# Patient Record
Sex: MALE | Race: WHITE
[De-identification: names, ages, dates, MRNs, and addresses within clinical notes are randomized per-mention and may not be internally consistent; named-entity substitution may affect disease eponyms.]

---

## 2017-05-08 NOTE — HP
COWS





- Scale


Resting Pulse: 0= [place] 80 or Below


Sweatin= Chills/Flushing


Restless Observation: 3= Extraneous Movement


Pupil Size: 0= Normal to Room Light


Bone or Joint Aches: 2= Severe Diffuse Aches


Runny Nose/ Eye Tearin= Runny Nose/Eyes


GI Upset > 30mins: 1= Stomach Cramp


Tremor Observation: 2= Slight Tremor Visible


Yawning Observation: 1= 1-2x During Session


Anxiety or Irritability: 2=Irritable/Anxious


Goose Flesh Skin: 0=Smooth Skin


COWS Score: 14





Admission [facility]





- [facility]


Chief Complaint: 


WITHDRAWAL SX


Allergies/Adverse Reactions: 


 Allergies











Allergy/AdvReac Type Severity Reaction Status Date / Time


 


No Known Allergies Allergy   Verified 17 20:23











History of Present Illness: 


31 YEARS OLD MALE WITH LONG HISTORY OF OPIOID NICOTINE DEPENDENCE, HAS TOOTH 

ACHE, POSITIVE PPD AND DEPRESSION IS ADMITTED TO DETOX


Exam Limitations: No Limitations





- Ebola screening


Have you traveled outside of the country in the last 21 days: No


Have you had contact with anyone from an Ebola affected area: No


Have you been sick,other than usual withdrawal symptoms: No


Do you have a fever: No





- Review of Systems


Constitutional: Chills, Changes in sleep, Weight Stable


EENT: reports: Dental Problems (TOOTH ACHE)


Respiratory: reports: No Symptoms reported


Cardiac: reports: No Symptoms Reported


GI: reports: Nausea, Poor Fluid Intake, Abdominal cramping


: reports: No Symptoms Reported


Musculoskeletal: reports: Back Pain, Joint Pain, Muscle Pain, Neck Pain


Integumentary: reports: No Symptoms Reported


Neuro: reports: Tremors


Endocrine: reports: No Symptoms Reported


Hematology: reports: No Symptoms Reported


Psychiatric: reports: Judgement Intact, Orientated x3, Anxious, Depressed


Other Systems: Reviewed and Negative





Patient History





- Patient Medical History


Hx Anemia: No


Hx Asthma: No


Hx Chronic Obstructive Pulmonary Disease (COPD): No


Hx Cancer: No


Hx Cardiac Disorders: No


Hx Congestive Heart Failure: No


Hx Hypertension: No


Hx Hypercholesterolemia: No


Hx Pacemaker: No


HX Cerebrovascular Accident: No


Hx Seizures: No


Hx Dementia: No


Hx Diabetes: No


Hx Gastrointestinal Disorders: No


Hx Liver Disease: No


Hx Genitourinary Disorders: No


Hx Sexually Transmitted Disorders: No


Hx Renal Disease (ESRD): No


Hx Thyroid Disease: No


Hx Human Immunodeficiency Virus (HIV): No


Hx Hepatitis C: No


Hx Depression: Yes


Hx Suicide Attempt: No


Hx Bipolar Disorder: No


Hx Schizophrenia: No





- Patient Surgical History


Past Surgical History: Yes


Hx Neurologic Surgery: No


Hx Cataract Extraction: No


Hx Cardiac Surgery: No


Hx Lung Surgery: No


Hx Breast Surgery: No


Hx Breast Biopsy: No


Hx Abdominal Surgery: No


Hx Appendectomy: No


Hx Cholecystectomy: No


Hx Genitourinary Surgery: No


Hx Orthopedic Surgery: No


Other Surgical History: s/p tonsillectomy DURING CHILDHOOD


Anesthesia Reaction: No





- PPD History


Previous Implant?: Yes


Documented Results: Positive w/o proof


Implanted On Prior [facility] Admission?: Yes


Date: 10/19/15


Results: 15mm


PPD to be Administered?: No





- Smoking Cessation


Smoking history: Current every day smoker


Have you smoked in the past 12 months: Yes


Aproximately how many cigarettes per day: 20


Cigars Per Day: 0


Hx Chewing Tobacco Use: No


Initiated information on smoking cessation: Yes


'Breaking Loose' booklet given: 17





- Substance & Tx. History


Hx Alcohol Use: No


Hx Substance Use: Yes


Substance Use Type: Alcohol, Cocaine, Opiates


Hx Substance Use Treatment: Yes





- Substances Abused


  ** Heroin


Route: Inhalation


Frequency: Daily


Amount used: 5 BAGS


Age of first use: 28


Date of Last Use: 17





Family Disease History





- Family Disease History


Family History: Unremarkable





Admission Physical Exam [facility]





- Vital Signs


Vital Signs: 


 Vital Signs - 24 hr











  17





  17:48


 


Temperature 97.4 F L


 


Pulse Rate 73


 


Respiratory 18





Rate 


 


Blood Pressure 109/66














- Physical


General Appearance: Yes: Appropriately Dressed, Mild Distress, Thin, Tremorous, 

Irritable, Sweating, Anxious


HEENTM: Yes: Hearing grossly Normal, Normal ENT Inspection, Normocephalic, 

Normal Voice


Respiratory: Yes: Chest Non-Tender, Lungs Clear, Normal Breath Sounds, No 

Respiratory Distress, No Accessory Muscle Use


Neck: Yes: Supple, Trachea in good position


Breast: Yes: Breasts Symetrical


Cardiology: Yes: Regular Rhythm, Regular Rate, S1, S2


Abdominal: Yes: Non Tender, Soft


Genitourinary: Yes: Within Normal Limits


Back: Yes: Normal Inspection


Musculoskeletal: Yes: full range of Motion, Gait Steady, Back pain, Muscle Pain


Extremities: Yes: Normal Inspection, Normal Range of Motion, Non-Tender, Tremors


Neurological: Yes: Fully Oriented, Alert, Motor Strength 5/5, Normal Response, 

Depressed Affect


Integumentary: Yes: Warm


Lymphatic: Yes: Within Normal Limits





- Diagnostic


(1) Opioid dependence with withdrawal


Status: Acute





(2) Nicotine dependence


Status: Acute   Qualifiers: 


     Nicotine product type: cigarettes     Substance use status: in withdrawal 

       Qualified Code(s): F17.213 - Nicotine dependence, cigarettes, with 

withdrawal  





(3) Positive PPD, treated


Status: Resolved





(4) Depression


Status: Suspected   Qualifiers: 


     Depression Type: dysthymia        Qualified Code(s): F34.1 - Dysthymic 

disorder  





(5) Atypical toothache


Status: Acute


Comment: LIDOCAINE BEFORE EACH MEAL











Cleared for Admission [facility]





- Detox or Rehab


[facility] Level of Care: Medically Managed


Detox Regimen/Protocol: Methadone





[facility] Breath Alcohol Content


Breath Alcohol Content: 0





Urine Drug Screen





- Results


Drug Screen Negative: No


Urine Drug Screen Results: PABLO-Cocaine, OPI-Opiates, MTD-Methadone

## 2017-05-09 NOTE — PN
[facility] COWS





- Scale


Resting Pulse: 1= [place] 


Sweatin=Flushed/Facial Moisture


Restless Observation: 1= Difficult to Sit Still


Pupil Size: 0= Normal to Room Light


Bone or Joint Aches: 2= Severe Diffuse Aches


Runny Nose/ Eye Tearin= Runny Nose/Eyes


GI Upset > 30mins: 1= Stomach Cramp


Tremor Observation of Outstretched Hands: 2= Slight Tremor Visible


Yawning Observation: 2= >3x During Session


Anxiety or Irritability: 2=Irritable/Anxious


Goose Flesh Skin: 3=Piloerection


COWS Score: 18





[facility] Progress Note (SOAP)


Subjective: 


irritable


agitation


chills


sweats


interrupted sleep


body aches


nausea


Objective: 





17 11:26


 Vital Signs











Temperature  98.4 F   17 11:00


 


Pulse Rate  82   17 11:00


 


Respiratory Rate  16   17 11:00


 


Blood Pressure  119/73   17 11:00


 


O2 Sat by Pulse Oximetry (%)      








 Laboratory Tests











  17





  23:50 07:00 07:00


 


WBC   7.9 


 


RBC   4.84 


 


Hgb   14.7 


 


Hct   43.3 


 


MCV   89.4 


 


MCHC   34.0 


 


RDW   13.0 


 


Plt Count   194 


 


MPV   7.8 


 


Sodium    143


 


Potassium    3.6


 


Chloride    106


 


Carbon Dioxide    27


 


Anion Gap    10


 


BUN    14  D


 


Creatinine    1.1  D


 


Creat Clearance w eGFR    > 60


 


Random Glucose    89


 


Calcium    8.9


 


Total Bilirubin    0.3


 


AST    9 L D


 


ALT    14  D


 


Alkaline Phosphatase    66


 


Total Protein    6.9


 


Albumin    3.8


 


Urine Color  Dkyellow  


 


Urine Appearance  Clear  


 


Urine pH  5.0  


 


Ur Specific Gravity  1.020  


 


Urine Protein  Negative  


 


Urine Glucose (UA)  Negative  


 


Urine Ketones  Trace H  


 


Urine Blood  Negative  


 


Urine Nitrite  Negative  


 


Urine Bilirubin  Negative  


 


Urine Urobilinogen  Negative  


 


Ur Leukocyte Esterase  Negative  


 


RPR Titer   














  17





  07:00


 


WBC 


 


RBC 


 


Hgb 


 


Hct 


 


MCV 


 


MCHC 


 


RDW 


 


Plt Count 


 


MPV 


 


Sodium 


 


Potassium 


 


Chloride 


 


Carbon Dioxide 


 


Anion Gap 


 


BUN 


 


Creatinine 


 


Creat Clearance w eGFR 


 


Random Glucose 


 


Calcium 


 


Total Bilirubin 


 


AST 


 


ALT 


 


Alkaline Phosphatase 


 


Total Protein 


 


Albumin 


 


Urine Color 


 


Urine Appearance 


 


Urine pH 


 


Ur Specific Gravity 


 


Urine Protein 


 


Urine Glucose (UA) 


 


Urine Ketones 


 


Urine Blood 


 


Urine Nitrite 


 


Urine Bilirubin 


 


Urine Urobilinogen 


 


Ur Leukocyte Esterase 


 


RPR Titer  Nonreactive








awake/alert


ambulating


no acute distress


Assessment: 





17 11:26


withdrawal sx


Plan: 


continue detox


increase fluids


tigan po

## 2017-05-09 NOTE — EKG
Test Reason : 

Blood Pressure : ***/*** mmHG

Vent. Rate : 093 BPM     Atrial Rate : 093 BPM

   P-R Int : 148 ms          QRS Dur : 086 ms

    QT Int : 344 ms       P-R-T Axes : 080 066 047 degrees

   QTc Int : 427 ms

 

NORMAL SINUS RHYTHM

NORMAL ECG

NO PREVIOUS ECGS AVAILABLE

Confirmed by RAMOS THOMAS MD ([id number]) on 5/9/2017 3:07:08 PM

 

Referred By:             Confirmed By:RAMOS THOMAS MD

## 2017-05-09 NOTE — CONSULT
[facility] Psychiatric Consult





- Data


Date of interview: 05/09/17


Admission source: [facility]


Identifying data: Readmission to [facility] for this 30 y/o [place]-born male 

seeking detox treatment,[address],for heroin and cocaine dependence.Patient is 

single without children,domiciled,unemployed and supported by relatives.


Substance Abuse History: - Smoking Cessation.  Smoking history: Current every 

day smoker.  Have you smoked in the past 12 months: Yes.  Aproximately how many 

cigarettes per day: 20.  Cigars Per Day: 0.  Hx Chewing Tobacco Use: No.  

Initiated information on smoking cessation: Yes.  'Breaking Loose' booklet given

: 05/08/17.  - Substance & Tx. History.  Hx Alcohol Use: No.  Hx Substance Use: 

Yes.  Substance Use Type: Alcohol, Cocaine, Opiates.  Hx Substance Use Treatment

: Yes.  - Substances Abused.  ** Heroin.  Route: Inhalation.  Frequency: Daily.

  Amount used: 5 BAGS.  Age of first use: 28.  Date of Last Use: 05/07/17.  

Confirmed by patient.


Medical History: Good general health.Remote history of tonsillectomy (childhood)

.


Psychiatric History: Patient denies.


Physical/Sexual Abuse/Trauma History: Patient denies.


Additional Comment: Urine Drug Screen Results: PABLO-Cocaine, OPI-Opiates, MTD-

Methadone.Noted.





Mental Status Exam





- Mental Status Exam


Alert and Oriented to: Time, Place, Person


Cognitive Function: Good


Patient Appearance: Unkempt, Disheveled


Mood: Nervous, Withdrawn, Anxious


Affect: Mood Congruent


Patient Behavior: Fatigued, Appropriate, Cooperative


Speech Pattern: Clear


Voice Loudness: Normal


Thought Process: Goal Oriented


Thought Disorder: Not Present


Hallucinations: Denies


Suicidal Ideation: Denies


Homicidal Ideation: Denies


Insight/Judgement: Poor


Sleep: Poorly, Difficulty falling asleep


Appetite: Fair


Muscle strength/Tone: Normal


Gait/Station: Normal





Psychiatric Findings





- Problem List (Axis 1, 2,3)


(1) Opioid dependence with withdrawal


Current Visit: Yes   Status: Acute





(2) Cocaine dependence


Current Visit: Yes   Status: Acute   





(3) Nicotine dependence


Current Visit: Yes   Status: Acute   Qualifiers: 


     Nicotine product type: cigarettes     Substance use status: in withdrawal 

       Qualified Code(s): F17.213 - Nicotine dependence, cigarettes, with 

withdrawal  





(4) Substance induced mood disorder


Current Visit: Yes   Status: Acute





(5) Positive PPD, treated


Current Visit: Yes   Status: Resolved





(6) Insomnia


Current Visit: Yes   Status: Acute   








- Initial Treatment Plan


Initial Treatment Plan: Psychoeducation.Detoxification.Ambien 10 mg po 

hs.Patient is made aware of the potential for parasomnias.Consent (verbal) 

given for treatment with zolpidem.Observation.

## 2017-05-16 NOTE — DS
[facility] Detox Discharge Summary


Admission Date: 


05/08/17





Discharge Date: 05/09/17





- History


Present History: Cocaine Dependence, Opioid Dependence





- Physical Exam Results


Vital Signs: 


 Vital Signs











Temperature  99.0 F   05/09/17 18:17


 


Pulse Rate  73   05/09/17 18:17


 


Respiratory Rate  18   05/09/17 18:17


 


Blood Pressure  104/63   05/09/17 18:17


 


O2 Sat by Pulse Oximetry (%)      














- Treatment


Hospital Course: Detox Protocol Followed





- Medication


Discharge Medications: 


Ambulatory Orders





NK [No Known Home Medication]  05/08/17 











- Diagnosis


(1) Anxiety


Status: Chronic





(2) Atypical toothache


Status: Acute





(3) Cannabis dependence


Status: Chronic





(4) Cocaine dependence


Status: Chronic   Qualifiers: 


     Complication of substance-induced condition: with unspecified complication





(5) Nicotine dependence


Status: Acute   Qualifiers: 


     Nicotine product type: cigarettes     Substance use status: in withdrawal 

       Qualified Code(s): F17.213 - Nicotine dependence, cigarettes, with 

withdrawal  








- AMA


Did Patient Leave Against Medical Advice: Yes

## 2018-05-07 NOTE — HP
COWS





- Scale


Resting Pulse: 0= [place] 80 or Below


Sweatin= Chills/Flushing


Restless Observation: 1= Difficult to Sit Still


Pupil Size: 1= Pupils >than Normal


Bone or Joint Aches: 1= Mild Discomfort


Runny Nose/ Eye Tearin= Runny Nose/Eyes


GI Upset > 30mins: 1= Stomach Cramp


Tremor Observation: 1= Tremor Felt, Not Seen


Yawning Observation: 1= 1-2x During Session


Anxiety or Irritability: 1=Feels Anxious/Irritable


Goose Flesh Skin: 0=Smooth Skin


COWS Score: 10





Admission ROS S





- HPI


Chief Complaint: 





" I have a problem with opiates, sometimes I use methadone"


heroin withdrawal symptoms  


Allergies/Adverse Reactions: 


 Allergies











Allergy/AdvReac Type Severity Reaction Status Date / Time


 


No Known Allergies Allergy   Verified 18 17:50











History of Present Illness: 





31 yo male with hx of heroin (paranasal) and nicotine dependence is here 

seeking detox. PMHX: depression and insomnia. Denies suicidal / homicidal 

ideation or suicide attempts. Last detox 7 moths ago at [facility]. 

Longest period of sobriety 5 days over the 5 years hx of heroin use. Denies hx 

of overdose, seizures or blackouts. 


Exam Limitations: No Limitations





- Ebola screening


Have you traveled outside of the country in the last 21 days: No


Have you had contact with anyone from an Ebola affected area: No


Have you been sick,other than usual withdrawal symptoms: No


Do you have a fever: No





- Review of Systems


Constitutional: Chills, Changes in sleep, Weakness


EENT: reports: Other (runny nose)


Respiratory: reports: No Symptoms reported


Cardiac: reports: Chest Pain (denies chest pain at this time, reports gets 

chest pain when he goes through withdrawal)


GI: reports: Abdominal cramping


: reports: No Symptoms Reported


Musculoskeletal: reports: Back Pain, Joint Pain


Integumentary: reports: No Symptoms Reported


Neuro: reports: Headache (7/10), Weakness


Endocrine: reports: Increased Thirst


Hematology: reports: No Symptoms Reported


Psychiatric: reports: Orientated x3, Depressed


Other Systems: Reviewed and Negative





Patient History





- Patient Medical History


Hx Anemia: No


Hx Asthma: No


Hx Chronic Obstructive Pulmonary Disease (COPD): No


Hx Cancer: No


Hx Cardiac Disorders: No


Hx Congestive Heart Failure: No


Hx Hypertension: No


Hx Hypercholesterolemia: No


Hx Pacemaker: No


HX Cerebrovascular Accident: No


Hx Seizures: No


Hx Dementia: No


Hx Diabetes: No


Hx Gastrointestinal Disorders: No


Hx Liver Disease: No


Hx Genitourinary Disorders: No


Hx Sexually Transmitted Disorders: No


Hx Renal Disease (ESRD): No


Hx Thyroid Disease: No


Hx Human Immunodeficiency Virus (HIV): No


Hx Hepatitis C: No


Hx Depression: Yes


Hx Suicide Attempt: No


Hx Bipolar Disorder: No


Hx Schizophrenia: No





- Patient Surgical History


Past Surgical History: Yes


Hx Neurologic Surgery: No


Hx Cataract Extraction: No


Hx Cardiac Surgery: No


Hx Lung Surgery: No


Hx Breast Surgery: No


Hx Breast Biopsy: No


Hx Abdominal Surgery: No


Hx Appendectomy: No


Hx Cholecystectomy: No


Hx Genitourinary Surgery: No


Hx  Section: No


Hx Orthopedic Surgery: No


Other Surgical History: s/p tonsillectomy DURING CHILDHOOD


Anesthesia Reaction: No





- PPD History


Date: 10/19/15


Results: 15mm


PPD to be Administered?: No





- Reproductive History


Patient is a Female of Child Bearing Age (11 -55 yrs old): No





- Smoking Cessation


Smoking history: Current every day smoker


Have you smoked in the past 12 months: Yes


Aproximately how many cigarettes per day: 20


Cigars Per Day: 0


Hx Chewing Tobacco Use: No


Initiated information on smoking cessation: Yes


'Breaking Loose' booklet given: 18





- Substance & Tx. History


Hx Alcohol Use: No


Hx Substance Use: Yes


Substance Use Type: Heroin





- Substances Abused


  ** Heroin


Route: SNIFF


Frequency: Daily


Amount used: 5 BAGS


Age of first use: 29


Date of Last Use: 18





Family Disease History





- Family Disease History


Family Disease History: Heart Disease: Mother (, heart disease ), Other: 

Father (alive, no health problems ), Mother





Admission Physical Exam [facility]





- Vital Signs


Vital Signs: 


 Vital Signs - 24 hr











  18





  17:15


 


Temperature 98.1 F


 


Pulse Rate 78


 


Respiratory 18





Rate 


 


Blood Pressure 124/63














- Physical


General Appearance: Yes: Disheveled, Thin, Sweating, Anxious, Other (malodorous)


HEENTM: Yes: EOMI, Hearing grossly Normal, Normal ENT Inspection, Normocephalic

, Normal Voice, ANDREA, Pharynx Normal, Tm's normal


Respiratory: Yes: Chest Non-Tender, Lungs Clear, Normal Breath Sounds, No 

Respiratory Distress, No Accessory Muscle Use


Neck: Yes: Within Normal Limits


Breast: Yes: Breast Exam Deferred


Cardiology: Yes: Regular Rhythm, Regular Rate


Abdominal: Yes: Normal Bowel Sounds, Non Tender, Flat, Soft


Genitourinary: Yes: Within Normal Limits


Back: Yes: Within Normal Limits


Musculoskeletal: Yes: full range of Motion, Gait Steady, Pelvis Stable, Back 

pain


Extremities: Yes: Normal Capillary Refill, Normal Inspection, Normal Range of 

Motion, Non-Tender


Neurological: Yes: CNs II-XII NML intact, Fully Oriented, Alert, Motor Strength 

5/5, Depressed Affect


Integumentary: Yes: Normal Color, Warm, Diaphoresis


Lymphatic: Yes: Within Normal Limits





- Diagnostic


(1) Low back pain


Current Visit: Yes   Status: Acute   


Qualifiers: 


   Chronicity: acute   Back pain laterality: right   Sciatica presence: without 

sciatica   Qualified Code(s): M54.5 - Low back pain   





(2) Nicotine dependence


Current Visit: Yes   Status: Acute   


Qualifiers: 


   Nicotine product type: cigarettes 





(3) Opioid dependence with withdrawal


Current Visit: Yes   Status: Acute   





(4) Anxiety and depression


Current Visit: Yes   Status: Acute   





Cleared for Admission [facility]





- Detox or Rehab


[facility] Level of Care: Medically Supervised


Detox Regimen/Protocol: Methadone





[facility] Breath Alcohol Content


Breath Alcohol Content: 0





Urine Drug Screen





- Results


Drug Screen Negative: No


Urine Drug Screen Results: OPI-Opiates, BZO-Benzodiazepines, MTD-Methadone

## 2018-05-08 NOTE — PN
[facility] COWS





- Scale


Resting Pulse: 1= [place] 


Sweatin=Flushed/Facial Moisture


Restless Observation: 0= Sits Still


Pupil Size: 0= Normal to Room Light


Bone or Joint Aches: 2= Severe Diffuse Aches


Runny Nose/ Eye Tearin= None


GI Upset > 30mins: 2= Nausea/Diarrhea


Tremor Observation of Outstretched Hands: 2= Slight Tremor Visible


Yawning Observation: 1= 1-2x During Session


Anxiety or Irritability: 2=Irritable/Anxious


Goose Flesh Skin: 3=Piloerection


COWS Score: 15





[facility] Progress Note (SOAP)


Subjective: 





Tremors, Anxious, Diarrhea, Stomach Cramping, Nausea, Sweating, Body Aches.


Objective: 


PATIENT A & O X 3. NO ACUTE DISTRESS.





18 11:25


 Vital Signs











Temperature  97.0 F L  18 09:57


 


Pulse Rate  97 H  18 09:57


 


Respiratory Rate  74 H  18 09:57


 


Blood Pressure  92/58   18 09:57


 


O2 Sat by Pulse Oximetry (%)      








 Laboratory Tests











  18





  18:17 07:30 07:30


 


WBC   7.5 


 


RBC   4.50 


 


Hgb   14.0 


 


Hct   39.7 


 


MCV   88.1 


 


MCH   31.1 


 


MCHC   35.3 


 


RDW   13.6 


 


Plt Count   203 


 


MPV   7.9 


 


Sodium    141


 


Potassium    3.7


 


Chloride    107


 


Carbon Dioxide    27


 


Anion Gap    7 L


 


BUN    12


 


Creatinine    0.9


 


Creat Clearance w eGFR    > 60


 


Random Glucose    171 H D


 


Calcium    8.6


 


Total Bilirubin    0.3


 


AST    10 L


 


ALT    10 L D


 


Alkaline Phosphatase    48  D


 


Total Protein    6.6


 


Albumin    3.7


 


Urine Color  Yellow  


 


Urine Appearance  Turbid  


 


Urine pH  5.0  


 


Ur Specific Gravity  1.034  


 


Urine Protein  1+ H  


 


Urine Glucose (UA)  Negative  


 


Urine Ketones  Trace H  


 


Urine Blood  Negative  


 


Urine Nitrite  Negative  


 


Urine Bilirubin  Negative  


 


Urine Urobilinogen  2.0  


 


Ur Leukocyte Esterase  Negative  


 


Urine WBC (Auto)  72  


 


Urine RBC (Auto)  2  


 


Urine Bacteria  Rare  


 


Hyaline Casts  20  


 


Urine Mucus  Many  








LABS NOTED.


RPR RESULT PENDING.


18 11:28





Assessment: 





18 11:26


WITHDRAWAL SYMPTOMS.


Plan: 





CONTINUE DETOX.


INCREASE DAILY PO FLUID INTAKE.


B[facility] ACBK FOR ELEVATED ADMISSION RANDOM GLUCOSE LEVEL.


REPEAT UA FOR ADMISSION ABNORMALITIES.

## 2018-05-08 NOTE — CONSULT
[facility] Psychiatric Consult





- Data


Date of interview: 05/08/18


Admission source: [facility]


Identifying data: Another admission to [facility] for this 31 y/o [place]-born 

male seeking detox treatment,[address],for heroin dependence.Patient is single 

without children,domiciled,unemployed and supported by relatives.


Substance Abuse History: Confirmed by patient in this interview.Details in 

current [facility] report : Smoking history: Current every day smoker.  Have you 

smoked in the past 12 months: Yes.  Aproximately how many cigarettes per day: 

20.  Cigars Per Day: 0.  Hx Chewing Tobacco Use: No.  Initiated information on 

smoking cessation: Yes.  'Breaking Loose' booklet given: 05/07/18.  - Substance 

& Tx. History.  Hx Alcohol Use: No.  Hx Substance Use: Yes.  Substance Use Type

: Heroin.  - Substances Abused.  ** Heroin.  Route: SNIFF.  Frequency: Daily.  

Amount used: 5 BAGS.  Age of first use: 29.  Date of Last Use: 05/07/18


Medical History: Patient endorses good general health.Remote history of 

tonsillectomy (childhood).


Psychiatric History: Patient denies.


Physical/Sexual Abuse/Trauma History: Patient denies.


Additional Comment: Urine Drug Screen Results: OPI-Opiates, BZO-Benzodiazepines

, MTD-Methadone.Noted.





Mental Status Exam





- Mental Status Exam


Alert and Oriented to: Time, Place, Person


Cognitive Function: Good


Patient Appearance: Unkempt, Disheveled


Mood: Nervous, Withdrawn


Affect: Mood Congruent


Patient Behavior: Fatigued, Appropriate, Cooperative


Speech Pattern: Clear


Voice Loudness: Normal


Thought Process: Intact, Goal Oriented


Thought Disorder: Not Present


Hallucinations: Denies


Suicidal Ideation: Denies


Homicidal Ideation: Denies


Insight/Judgement: Poor


Sleep: Poorly, Difficulty falling asleep


Appetite: Good


Muscle strength/Tone: Normal


Gait/Station: Normal





Psychiatric Findings





- Problem List (Axis 1, 2,3)


(1) Opioid dependence with withdrawal


Current Visit: Yes   Status: Acute   





(2) Nicotine dependence


Current Visit: Yes   Status: Acute   


Qualifiers: 


   Nicotine product type: cigarettes   Substance use status: uncomplicated   

Qualified Code(s): F17.210 - Nicotine dependence, cigarettes, uncomplicated   





(3) Substance induced mood disorder


Current Visit: Yes   Status: Acute   





(4) Insomnia


Current Visit: Yes   Status: Acute   





- Initial Treatment Plan


Initial Treatment Plan: Psychoeducation.Sleep hygiene.Detoxification in 

progress.Trazodone 100 mg po hs (patient's request).Mr Taveras is made aware of 

risk of priapism.Agrees with this careplan.Observation.

## 2018-05-09 NOTE — PN
BHS COWS





- Scale


Resting Pulse: 0= MD 80 or Below


Sweatin= Chills/Flushing


Restless Observation: 0= Sits Still


Pupil Size: 0= Normal to Room Light


Bone or Joint Aches: 1= Mild Discomfort


Runny Nose/ Eye Tearin= Runny Nose/Eyes


GI Upset > 30mins: 0= None


Tremor Observation of Outstretched Hands: 2= Slight Tremor Visible


Yawning Observation: 2= >3x During Session


Anxiety or Irritability: 2=Irritable/Anxious


Goose Flesh Skin: 3=Piloerection


COWS Score: 13





BHS Progress Note (SOAP)


Subjective: 





Tremors, Fatigue, H/A, Body Aches.


Objective: 


PATIENT A & O X 3. NO ACUTE DISTRESS.


PATIENT DENIES ANY UNUSUAL URINARY SYMPTOMS (BURNING, PAIN, FREQUENCY, URGENCY).





18 12:10


 Vital Signs











Temperature  97.1 F L  18 06:06


 


Pulse Rate  58 L  18 06:06


 


Respiratory Rate  18   18 06:06


 


Blood Pressure  96/67   18 06:06


 


O2 Sat by Pulse Oximetry (%)      








 Laboratory Tests











  18





  18:17 07:30 07:30


 


WBC   7.5 


 


RBC   4.50 


 


Hgb   14.0 


 


Hct   39.7 


 


MCV   88.1 


 


MCH   31.1 


 


MCHC   35.3 


 


RDW   13.6 


 


Plt Count   203 


 


MPV   7.9 


 


Sodium    141


 


Potassium    3.7


 


Chloride    107


 


Carbon Dioxide    27


 


Anion Gap    7 L


 


BUN    12


 


Creatinine    0.9


 


Creat Clearance w eGFR    > 60


 


POC Glucometer   


 


Random Glucose    171 H D


 


Calcium    8.6


 


Total Bilirubin    0.3


 


AST    10 L


 


ALT    10 L D


 


Alkaline Phosphatase    48  D


 


Total Protein    6.6


 


Albumin    3.7


 


Urine Color  Yellow  


 


Urine Appearance  Turbid  


 


Urine pH  5.0  


 


Ur Specific Gravity  1.034  


 


Urine Protein  1+ H  


 


Urine Glucose (UA)  Negative  


 


Urine Ketones  Trace H  


 


Urine Blood  Negative  


 


Urine Nitrite  Negative  


 


Urine Bilirubin  Negative  


 


Urine Urobilinogen  2.0  


 


Ur Leukocyte Esterase  Negative  


 


Urine WBC (Auto)  72  


 


Urine RBC (Auto)  2  


 


Urine Bacteria  Rare  


 


Hyaline Casts  20  


 


Urine Mucus  Many  


 


RPR Titer   














  18





  07:30 13:15 05:08


 


WBC   


 


RBC   


 


Hgb   


 


Hct   


 


MCV   


 


MCH   


 


MCHC   


 


RDW   


 


Plt Count   


 


MPV   


 


Sodium   


 


Potassium   


 


Chloride   


 


Carbon Dioxide   


 


Anion Gap   


 


BUN   


 


Creatinine   


 


Creat Clearance w eGFR   


 


POC Glucometer    120


 


Random Glucose   


 


Calcium   


 


Total Bilirubin   


 


AST   


 


ALT   


 


Alkaline Phosphatase   


 


Total Protein   


 


Albumin   


 


Urine Color   Jennifer 


 


Urine Appearance   Cloudy 


 


Urine pH   7.0  D 


 


Ur Specific Gravity   1.023 


 


Urine Protein   Negative 


 


Urine Glucose (UA)   Negative 


 


Urine Ketones   Negative 


 


Urine Blood   Negative 


 


Urine Nitrite   Negative 


 


Urine Bilirubin   Negative 


 


Urine Urobilinogen   Negative 


 


Ur Leukocyte Esterase   Negative 


 


Urine WBC (Auto)   


 


Urine RBC (Auto)   


 


Urine Bacteria   


 


Hyaline Casts   


 


Urine Mucus   


 


RPR Titer  Nonreactive  








LABS NOTED.


RESULTS OF REPEAT UA NOTED.


18 12:11





Assessment: 





18 12:11


WITHDRAWAL SYMPTOMS.


Plan: 





CONTINUE DETOX.


INCREASE DAILY PO FLUID INTAKE.


ENCOURAGE AMBULATION.

## 2018-05-10 NOTE — PN
Psychiatric Progress Note


Vital Signs: 


 Vital Signs











 Period  Temp  Pulse  Resp  BP Sys/Gagnon  Pulse Ox


 


 Last 24 Hr  96.3 F-98.5 F  68-80  16-18  /52-62  











Date of Session: 05/10/18


Chief Complaint:: "I can't sleep."


HPI: Patient admitted to  for opiate dependence.


ROS: Unremarkable.


Current Medications: 


Active Medications











Generic Name Dose Route Start Last Admin





  Trade Name Freq  PRN Reason Stop Dose Admin


 


Acetaminophen  650 mg  05/07/18 19:45  





  Tylenol -  PO   





  Q4H PRN   





  FEVER   


 


Al Hydroxide/Mg Hydroxide  30 ml  05/07/18 19:45  





  Mylanta Oral Suspension -  PO   





  Q6H PRN   





  DYSPEPSIA   


 


Diazepam  10 mg  05/07/18 19:45  05/10/18 11:59





  Valium -  PO  05/10/18 19:44  10 mg





  Q4H PRN   Administration





  WITHDRAWAL(CONT SUBST)   


 


Eucalyptus/Menthol/Phenol/Sorbitol  1 each  05/07/18 19:45  





  Cepastat Lozenge -  MM   





  Q4H PRN   





  SORE THROAT   


 


Guaifenesin  10 ml  05/07/18 19:45  





  Robitussin Dm -  PO   





  Q6H PRN   





  COUGH   


 


Hydroxyzine Pamoate  50 mg  05/07/18 19:45  





  Vistaril -  PO   





  Q4H PRN   





  AGITATION   


 


Ibuprofen  400 mg  05/07/18 19:45  





  Motrin -  PO   





  Q6H PRN   





  PAIN LEVEL 4-6   


 


Lidocaine  1 patch  05/08/18 10:00  05/10/18 10:07





  Lidoderm Patch -  TP   1 patch





  DAILY JULIÁN   Administration


 


Loperamide HCl  4 mg  05/07/18 19:45  





  Imodium -  PO   





  Q6H PRN   





  DIARRHEA   


 


Magnesium Citrate  300 ml  05/07/18 19:45  





  Citroma -  PO   





  Q48H PRN   





  CONSTIPATION   


 


Magnesium Hydroxide  30 ml  05/07/18 19:45  





  Milk Of Magnesia -  PO   





  DAILY PRN   





  CONSTIPATION   


 


Melatonin  5 mg  05/07/18 22:00  





  Melatonin  PO   





  HS PRN   





  INSOMNIA   


 


Methadone HCl  5 mg  05/12/18 06:00  





  Dolophine -  PO  05/12/18 06:01  





  ONCE@0600 ONE   


 


Methadone HCl  10 mg  05/11/18 10:00  





  Dolophine -  PO  05/11/18 10:01  





  ONCE ONE   


 


Miscellaneous  1 each  05/07/18 22:00  05/09/18 21:08





  Lidoderm Patch Removal  MC   Not Given





  DAILY@2200 JULIÁN   


 


Nicotine  21 mg  05/08/18 10:00  05/10/18 10:07





  Nicoderm Patch -  TD   21 mg





  DAILY JULIÁN   Administration


 


Nicotine Polacrilex  4 mg  05/09/18 14:14  05/10/18 10:07





  Nicorette Gum -  BUC   4 mg





  Q2H PRN   Administration





  NICOTINE REPLACEMENT RX   


 


Prenatal Multivit/Folic Acid/Iron  1 tab  05/08/18 10:00  05/10/18 10:04





  Prenatal Vitamins (Sjr) -  PO   1 tab





  DAILY JULIÁN   Administration


 


Pseudoephedrine/Triprolidine  1 combo  05/07/18 19:45  





  Actifed -  PO   





  TID PRN   





  NASAL CONGESTION   


 


Thiamine HCl  100 mg  05/07/18 22:00  05/09/18 22:03





  Vitamin B1 -  PO   100 mg





  HS JULIÁN   Administration


 


Trazodone HCl  100 mg  05/08/18 22:00  05/09/18 22:03





  Desyrel -  PO   100 mg





  HS JULIÁN   Administration











Medication(s) Change(s): Yes. Will increase trazodone to 150mg qhs.


Current Side Effect: No


Lab tests ordered: No


Lab tests reviewed: Yes


Provider note:: Writer met with patient whom requested a psychiatric 

reconsultation. Chart reviewed. Dr. Obando note read and appreciated. Patient c/

o difficulty sleeping. Pt. is currently prescribed trazodone 100mg. Trazodone 

to be increased to 150mg. Pt. also encouraged to take the melatonin 5mg for 

sleep. Benefits and side effects discussed. Pt. made aware of the risk of 

priapism when accepting trazodone. Psychoeducation and sleep hygiene provided. 

Patient satisified and receptive to feedback. Will continue to monitor.


Total face to face time:: 25





Mental Status Exam





- Mental Status Exam


Alert and Oriented to: Time, Place, Person


Cognitive Function: Good


Patient Appearance: Well Groomed


Mood: Hopeful


Affect: Mood Congruent


Patient Behavior: Cooperative


Speech Pattern: Appropriate


Voice Loudness: Normal


Thought Process: Intact, Goal Oriented


Thought Disorder: Not Present


Hallucinations: Denies


Suicidal Ideation: Denies


Homicidal Ideation: Denies


Insight/Judgement: Poor


Sleep: Poorly


Muscle strength/Tone: Normal


Gait/Station: Normal





Psychiatric Treatment Plan





- Problem List


(1) Insomnia


Current Visit: Yes   





(2) Nicotine dependence


Current Visit: Yes   


Qualifiers: 


   Nicotine product type: cigarettes   Substance use status: uncomplicated   

Qualified Code(s): F17.210 - Nicotine dependence, cigarettes, uncomplicated   





(3) Opioid dependence with withdrawal


Current Visit: Yes   





(4) Substance induced mood disorder


Current Visit: Yes

## 2018-05-10 NOTE — PN
BHS Progress Note (SOAP)


Subjective: 





Constipation, Stomach Cramping, Fatigue, Interrupted Sleep.


Objective: 


PATIENT A & O X 3. NO ACUTE DISTRESS.





05/10/18 10:59


 Vital Signs











Temperature  96.3 F L  05/10/18 09:04


 


Pulse Rate  74   05/10/18 09:04


 


Respiratory Rate  18   05/10/18 09:04


 


Blood Pressure  102/55   05/10/18 09:04


 


O2 Sat by Pulse Oximetry (%)      








 Laboratory Tests











  05/07/18 05/08/18 05/08/18





  18:17 07:30 07:30


 


WBC   7.5 


 


RBC   4.50 


 


Hgb   14.0 


 


Hct   39.7 


 


MCV   88.1 


 


MCH   31.1 


 


MCHC   35.3 


 


RDW   13.6 


 


Plt Count   203 


 


MPV   7.9 


 


Sodium    141


 


Potassium    3.7


 


Chloride    107


 


Carbon Dioxide    27


 


Anion Gap    7 L


 


BUN    12


 


Creatinine    0.9


 


Creat Clearance w eGFR    > 60


 


POC Glucometer   


 


Random Glucose    171 H D


 


Calcium    8.6


 


Total Bilirubin    0.3


 


AST    10 L


 


ALT    10 L D


 


Alkaline Phosphatase    48  D


 


Total Protein    6.6


 


Albumin    3.7


 


Urine Color  Yellow  


 


Urine Appearance  Turbid  


 


Urine pH  5.0  


 


Ur Specific Gravity  1.034  


 


Urine Protein  1+ H  


 


Urine Glucose (UA)  Negative  


 


Urine Ketones  Trace H  


 


Urine Blood  Negative  


 


Urine Nitrite  Negative  


 


Urine Bilirubin  Negative  


 


Urine Urobilinogen  2.0  


 


Ur Leukocyte Esterase  Negative  


 


Urine WBC (Auto)  72  


 


Urine RBC (Auto)  2  


 


Urine Bacteria  Rare  


 


Hyaline Casts  20  


 


Urine Mucus  Many  


 


RPR Titer   














  05/08/18 05/08/18 05/09/18





  07:30 13:15 05:08


 


WBC   


 


RBC   


 


Hgb   


 


Hct   


 


MCV   


 


MCH   


 


MCHC   


 


RDW   


 


Plt Count   


 


MPV   


 


Sodium   


 


Potassium   


 


Chloride   


 


Carbon Dioxide   


 


Anion Gap   


 


BUN   


 


Creatinine   


 


Creat Clearance w eGFR   


 


POC Glucometer    120


 


Random Glucose   


 


Calcium   


 


Total Bilirubin   


 


AST   


 


ALT   


 


Alkaline Phosphatase   


 


Total Protein   


 


Albumin   


 


Urine Color   Jennifer 


 


Urine Appearance   Cloudy 


 


Urine pH   7.0  D 


 


Ur Specific Gravity   1.023 


 


Urine Protein   Negative 


 


Urine Glucose (UA)   Negative 


 


Urine Ketones   Negative 


 


Urine Blood   Negative 


 


Urine Nitrite   Negative 


 


Urine Bilirubin   Negative 


 


Urine Urobilinogen   Negative 


 


Ur Leukocyte Esterase   Negative 


 


Urine WBC (Auto)   


 


Urine RBC (Auto)   


 


Urine Bacteria   


 


Hyaline Casts   


 


Urine Mucus   


 


RPR Titer  Nonreactive  














  05/10/18





  06:37


 


WBC 


 


RBC 


 


Hgb 


 


Hct 


 


MCV 


 


MCH 


 


MCHC 


 


RDW 


 


Plt Count 


 


MPV 


 


Sodium 


 


Potassium 


 


Chloride 


 


Carbon Dioxide 


 


Anion Gap 


 


BUN 


 


Creatinine 


 


Creat Clearance w eGFR 


 


POC Glucometer  91


 


Random Glucose 


 


Calcium 


 


Total Bilirubin 


 


AST 


 


ALT 


 


Alkaline Phosphatase 


 


Total Protein 


 


Albumin 


 


Urine Color 


 


Urine Appearance 


 


Urine pH 


 


Ur Specific Gravity 


 


Urine Protein 


 


Urine Glucose (UA) 


 


Urine Ketones 


 


Urine Blood 


 


Urine Nitrite 


 


Urine Bilirubin 


 


Urine Urobilinogen 


 


Ur Leukocyte Esterase 


 


Urine WBC (Auto) 


 


Urine RBC (Auto) 


 


Urine Bacteria 


 


Hyaline Casts 


 


Urine Mucus 


 


RPR Titer 








LABS NOTED.


Assessment: 





05/10/18 10:59


WITHDRAWAL SYMPTOMS.


Plan: 





CONTINUE DETOX.


INCREASE DAILY PO FLUID INTAKE.


PRN MOM FOR CONSTIPATION.


ENCOURAGE AMBULATION.

## 2018-05-11 NOTE — PN
BHS Progress Note (SOAP)


Subjective: 





Sweating, Anxious.


Objective: 


PATIENT A & O X 3, OBSERVED AMBULATING ON UNIT. NO ACUTE DISTRESS.





05/11/18 12:01


 Vital Signs











Temperature  96.1 F L  05/11/18 06:08


 


Pulse Rate  68   05/11/18 06:08


 


Respiratory Rate  18   05/11/18 06:30


 


Blood Pressure  78/47   05/11/18 06:08


 


O2 Sat by Pulse Oximetry (%)      








 Laboratory Tests











  05/07/18 05/08/18 05/08/18





  18:17 07:30 07:30


 


WBC   7.5 


 


RBC   4.50 


 


Hgb   14.0 


 


Hct   39.7 


 


MCV   88.1 


 


MCH   31.1 


 


MCHC   35.3 


 


RDW   13.6 


 


Plt Count   203 


 


MPV   7.9 


 


Sodium    141


 


Potassium    3.7


 


Chloride    107


 


Carbon Dioxide    27


 


Anion Gap    7 L


 


BUN    12


 


Creatinine    0.9


 


Creat Clearance w eGFR    > 60


 


POC Glucometer   


 


Random Glucose    171 H D


 


Calcium    8.6


 


Total Bilirubin    0.3


 


AST    10 L


 


ALT    10 L D


 


Alkaline Phosphatase    48  D


 


Total Protein    6.6


 


Albumin    3.7


 


Urine Color  Yellow  


 


Urine Appearance  Turbid  


 


Urine pH  5.0  


 


Ur Specific Gravity  1.034  


 


Urine Protein  1+ H  


 


Urine Glucose (UA)  Negative  


 


Urine Ketones  Trace H  


 


Urine Blood  Negative  


 


Urine Nitrite  Negative  


 


Urine Bilirubin  Negative  


 


Urine Urobilinogen  2.0  


 


Ur Leukocyte Esterase  Negative  


 


Urine WBC (Auto)  72  


 


Urine RBC (Auto)  2  


 


Urine Bacteria  Rare  


 


Hyaline Casts  20  


 


Urine Mucus  Many  


 


RPR Titer   














  05/08/18 05/08/18 05/09/18





  07:30 13:15 05:08


 


WBC   


 


RBC   


 


Hgb   


 


Hct   


 


MCV   


 


MCH   


 


MCHC   


 


RDW   


 


Plt Count   


 


MPV   


 


Sodium   


 


Potassium   


 


Chloride   


 


Carbon Dioxide   


 


Anion Gap   


 


BUN   


 


Creatinine   


 


Creat Clearance w eGFR   


 


POC Glucometer    120


 


Random Glucose   


 


Calcium   


 


Total Bilirubin   


 


AST   


 


ALT   


 


Alkaline Phosphatase   


 


Total Protein   


 


Albumin   


 


Urine Color   Jennifer 


 


Urine Appearance   Cloudy 


 


Urine pH   7.0  D 


 


Ur Specific Gravity   1.023 


 


Urine Protein   Negative 


 


Urine Glucose (UA)   Negative 


 


Urine Ketones   Negative 


 


Urine Blood   Negative 


 


Urine Nitrite   Negative 


 


Urine Bilirubin   Negative 


 


Urine Urobilinogen   Negative 


 


Ur Leukocyte Esterase   Negative 


 


Urine WBC (Auto)   


 


Urine RBC (Auto)   


 


Urine Bacteria   


 


Hyaline Casts   


 


Urine Mucus   


 


RPR Titer  Nonreactive  














  05/10/18 05/11/18





  06:37 00:03


 


WBC  


 


RBC  


 


Hgb  


 


Hct  


 


MCV  


 


MCH  


 


MCHC  


 


RDW  


 


Plt Count  


 


MPV  


 


Sodium  


 


Potassium  


 


Chloride  


 


Carbon Dioxide  


 


Anion Gap  


 


BUN  


 


Creatinine  


 


Creat Clearance w eGFR  


 


POC Glucometer  91  107


 


Random Glucose  


 


Calcium  


 


Total Bilirubin  


 


AST  


 


ALT  


 


Alkaline Phosphatase  


 


Total Protein  


 


Albumin  


 


Urine Color  


 


Urine Appearance  


 


Urine pH  


 


Ur Specific Gravity  


 


Urine Protein  


 


Urine Glucose (UA)  


 


Urine Ketones  


 


Urine Blood  


 


Urine Nitrite  


 


Urine Bilirubin  


 


Urine Urobilinogen  


 


Ur Leukocyte Esterase  


 


Urine WBC (Auto)  


 


Urine RBC (Auto)  


 


Urine Bacteria  


 


Hyaline Casts  


 


Urine Mucus  


 


RPR Titer  








LABS NOTED.


Assessment: 





05/11/18 12:01


WITHDRAWAL SYMPTOMS.


Plan: 





CONTINUE DETOX.


INCREASE DAILY PO FLUID INTAKE.

## 2018-05-12 NOTE — DS
BHS Detox Discharge Summary


Admission Date: 


05/07/18





Discharge Date: 05/12/18





- History


Present History: Opioid Dependence


Additional Comments: 





PATIENT WILL GO HOME FOR WEEKEND, THEN WILL CONTACT [facility] 

ADMISSIONS DEPT. ON MONDAY, 05/14/2018 TO INQUIRE ABOUT BED AVAILABILITY AT 

THAT TIME. PATIENT WAS DISCHARGED FROM DETOX UNIT IN STABLE MEDICAL CONDITION.


Pertinent Past History: 





Low Back Pain, Insomnia, Nicotine Dependence, Anxiety, Depression.





- Physical Exam Results


Vital Signs: 


 Vital Signs











Temperature  96.5 F L  05/12/18 06:04


 


Pulse Rate  76   05/12/18 06:04


 


Respiratory Rate  18   05/12/18 06:04


 


Blood Pressure  100/58   05/12/18 06:04


 


O2 Sat by Pulse Oximetry (%)      











Pertinent Admission Physical Exam Findings: 





WITHDRAWAL SYMPTOMS.





 Laboratory Tests











  05/07/18 05/08/18 05/08/18





  18:17 07:30 07:30


 


WBC   7.5 


 


RBC   4.50 


 


Hgb   14.0 


 


Hct   39.7 


 


MCV   88.1 


 


MCH   31.1 


 


MCHC   35.3 


 


RDW   13.6 


 


Plt Count   203 


 


MPV   7.9 


 


Sodium    141


 


Potassium    3.7


 


Chloride    107


 


Carbon Dioxide    27


 


Anion Gap    7 L


 


BUN    12


 


Creatinine    0.9


 


Creat Clearance w eGFR    > 60


 


POC Glucometer   


 


Random Glucose    171 H D


 


Calcium    8.6


 


Total Bilirubin    0.3


 


AST    10 L


 


ALT    10 L D


 


Alkaline Phosphatase    48  D


 


Total Protein    6.6


 


Albumin    3.7


 


Urine Color  Yellow  


 


Urine Appearance  Turbid  


 


Urine pH  5.0  


 


Ur Specific Gravity  1.034  


 


Urine Protein  1+ H  


 


Urine Glucose (UA)  Negative  


 


Urine Ketones  Trace H  


 


Urine Blood  Negative  


 


Urine Nitrite  Negative  


 


Urine Bilirubin  Negative  


 


Urine Urobilinogen  2.0  


 


Ur Leukocyte Esterase  Negative  


 


Urine WBC (Auto)  72  


 


Urine RBC (Auto)  2  


 


Urine Bacteria  Rare  


 


Hyaline Casts  20  


 


Urine Mucus  Many  


 


RPR Titer   














  05/08/18 05/08/18 05/09/18





  07:30 13:15 05:08


 


WBC   


 


RBC   


 


Hgb   


 


Hct   


 


MCV   


 


MCH   


 


MCHC   


 


RDW   


 


Plt Count   


 


MPV   


 


Sodium   


 


Potassium   


 


Chloride   


 


Carbon Dioxide   


 


Anion Gap   


 


BUN   


 


Creatinine   


 


Creat Clearance w eGFR   


 


POC Glucometer    120


 


Random Glucose   


 


Calcium   


 


Total Bilirubin   


 


AST   


 


ALT   


 


Alkaline Phosphatase   


 


Total Protein   


 


Albumin   


 


Urine Color   Jennifer 


 


Urine Appearance   Cloudy 


 


Urine pH   7.0  D 


 


Ur Specific Gravity   1.023 


 


Urine Protein   Negative 


 


Urine Glucose (UA)   Negative 


 


Urine Ketones   Negative 


 


Urine Blood   Negative 


 


Urine Nitrite   Negative 


 


Urine Bilirubin   Negative 


 


Urine Urobilinogen   Negative 


 


Ur Leukocyte Esterase   Negative 


 


Urine WBC (Auto)   


 


Urine RBC (Auto)   


 


Urine Bacteria   


 


Hyaline Casts   


 


Urine Mucus   


 


RPR Titer  Nonreactive  














  05/10/18 05/11/18 05/12/18





  06:37 00:03 06:32


 


WBC   


 


RBC   


 


Hgb   


 


Hct   


 


MCV   


 


MCH   


 


MCHC   


 


RDW   


 


Plt Count   


 


MPV   


 


Sodium   


 


Potassium   


 


Chloride   


 


Carbon Dioxide   


 


Anion Gap   


 


BUN   


 


Creatinine   


 


Creat Clearance w eGFR   


 


POC Glucometer  91  107  122


 


Random Glucose   


 


Calcium   


 


Total Bilirubin   


 


AST   


 


ALT   


 


Alkaline Phosphatase   


 


Total Protein   


 


Albumin   


 


Urine Color   


 


Urine Appearance   


 


Urine pH   


 


Ur Specific Gravity   


 


Urine Protein   


 


Urine Glucose (UA)   


 


Urine Ketones   


 


Urine Blood   


 


Urine Nitrite   


 


Urine Bilirubin   


 


Urine Urobilinogen   


 


Ur Leukocyte Esterase   


 


Urine WBC (Auto)   


 


Urine RBC (Auto)   


 


Urine Bacteria   


 


Hyaline Casts   


 


Urine Mucus   


 


RPR Titer   








LABS NOTED.





- Treatment


Hospital Course: Detox Protocol Followed, Detoxed Safely, Responded well, 

Discharged Condition Good, Rehab Referral Accepted


Patient has Accepted a Rehab Referral to: [facility] ([place]

.





- Medication


Discharge Medications: 


Ambulatory Orders





Trazodone HCl 150 mg PO HS #30 tablet 05/11/18 











- Diagnosis


(1) Anxiety and depression


Status: Acute   





(2) Low back pain


Status: Acute   


Qualifiers: 


   Chronicity: acute   Back pain laterality: right   Sciatica presence: without 

sciatica   Qualified Code(s): M54.5 - Low back pain   





(3) Nicotine dependence


Status: Acute   


Qualifiers: 


   Nicotine product type: cigarettes   Substance use status: uncomplicated   

Qualified Code(s): F17.210 - Nicotine dependence, cigarettes, uncomplicated   





(4) Opioid dependence with withdrawal


Status: Acute   





(5) Insomnia


Status: Acute   


Qualifiers: 


   Insomnia type: unspecified   Qualified Code(s): G47.00 - Insomnia, 

unspecified   





(6) Substance induced mood disorder


Status: Acute   





- AMA


Did Patient Leave Against Medical Advice: No

## 2018-05-12 NOTE — PN
BHS Progress Note (SOAP)


Subjective: 





Patient denies current Detox symptoms and reports that he feels well overall.


Objective: 


PATIENT A & O X 3, OBSERVED AMBULATING ON UNIT. NO ACUTE DISTRESS.





05/12/18 18:23


 Vital Signs











Temperature  96.5 F L  05/12/18 06:04


 


Pulse Rate  76   05/12/18 06:04


 


Respiratory Rate  18   05/12/18 06:04


 


Blood Pressure  100/58   05/12/18 06:04


 


O2 Sat by Pulse Oximetry (%)      








 Laboratory Tests











  05/07/18 05/08/18 05/08/18





  18:17 07:30 07:30


 


WBC   7.5 


 


RBC   4.50 


 


Hgb   14.0 


 


Hct   39.7 


 


MCV   88.1 


 


MCH   31.1 


 


MCHC   35.3 


 


RDW   13.6 


 


Plt Count   203 


 


MPV   7.9 


 


Sodium    141


 


Potassium    3.7


 


Chloride    107


 


Carbon Dioxide    27


 


Anion Gap    7 L


 


BUN    12


 


Creatinine    0.9


 


Creat Clearance w eGFR    > 60


 


POC Glucometer   


 


Random Glucose    171 H D


 


Calcium    8.6


 


Total Bilirubin    0.3


 


AST    10 L


 


ALT    10 L D


 


Alkaline Phosphatase    48  D


 


Total Protein    6.6


 


Albumin    3.7


 


Urine Color  Yellow  


 


Urine Appearance  Turbid  


 


Urine pH  5.0  


 


Ur Specific Gravity  1.034  


 


Urine Protein  1+ H  


 


Urine Glucose (UA)  Negative  


 


Urine Ketones  Trace H  


 


Urine Blood  Negative  


 


Urine Nitrite  Negative  


 


Urine Bilirubin  Negative  


 


Urine Urobilinogen  2.0  


 


Ur Leukocyte Esterase  Negative  


 


Urine WBC (Auto)  72  


 


Urine RBC (Auto)  2  


 


Urine Bacteria  Rare  


 


Hyaline Casts  20  


 


Urine Mucus  Many  


 


RPR Titer   














  05/08/18 05/08/18 05/09/18





  07:30 13:15 05:08


 


WBC   


 


RBC   


 


Hgb   


 


Hct   


 


MCV   


 


MCH   


 


MCHC   


 


RDW   


 


Plt Count   


 


MPV   


 


Sodium   


 


Potassium   


 


Chloride   


 


Carbon Dioxide   


 


Anion Gap   


 


BUN   


 


Creatinine   


 


Creat Clearance w eGFR   


 


POC Glucometer    120


 


Random Glucose   


 


Calcium   


 


Total Bilirubin   


 


AST   


 


ALT   


 


Alkaline Phosphatase   


 


Total Protein   


 


Albumin   


 


Urine Color   Jennifer 


 


Urine Appearance   Cloudy 


 


Urine pH   7.0  D 


 


Ur Specific Gravity   1.023 


 


Urine Protein   Negative 


 


Urine Glucose (UA)   Negative 


 


Urine Ketones   Negative 


 


Urine Blood   Negative 


 


Urine Nitrite   Negative 


 


Urine Bilirubin   Negative 


 


Urine Urobilinogen   Negative 


 


Ur Leukocyte Esterase   Negative 


 


Urine WBC (Auto)   


 


Urine RBC (Auto)   


 


Urine Bacteria   


 


Hyaline Casts   


 


Urine Mucus   


 


RPR Titer  Nonreactive  














  05/10/18 05/11/18 05/12/18





  06:37 00:03 06:32


 


WBC   


 


RBC   


 


Hgb   


 


Hct   


 


MCV   


 


MCH   


 


MCHC   


 


RDW   


 


Plt Count   


 


MPV   


 


Sodium   


 


Potassium   


 


Chloride   


 


Carbon Dioxide   


 


Anion Gap   


 


BUN   


 


Creatinine   


 


Creat Clearance w eGFR   


 


POC Glucometer  91  107  122


 


Random Glucose   


 


Calcium   


 


Total Bilirubin   


 


AST   


 


ALT   


 


Alkaline Phosphatase   


 


Total Protein   


 


Albumin   


 


Urine Color   


 


Urine Appearance   


 


Urine pH   


 


Ur Specific Gravity   


 


Urine Protein   


 


Urine Glucose (UA)   


 


Urine Ketones   


 


Urine Blood   


 


Urine Nitrite   


 


Urine Bilirubin   


 


Urine Urobilinogen   


 


Ur Leukocyte Esterase   


 


Urine WBC (Auto)   


 


Urine RBC (Auto)   


 


Urine Bacteria   


 


Hyaline Casts   


 


Urine Mucus   


 


RPR Titer   








LABS NOTED.


Assessment: 





05/12/18 18:23


COMPLETION OF DETOX REGIMEN.


Plan: 





PATIENT SCHEDULED FOR DISCHARGE FROM DETOX UNIT TODAY.

## 2018-05-14 NOTE — HP
[facility] MD Rehab Assess/Revision





- Admission History


Admitted to Rehab from: [facility] (COMPLETED [facility]OX [facility]  ON 5/12/18.)


Date of Admission to Rehab: 5/14/18





- Vital signs


Vital Signs: 


 Vital Signs











 Period  Temp  Pulse  Resp  BP Sys/Gagnon  Pulse Ox


 


 Last 24 Hr  98.1 F  112  18  126/76  














- Findings


Detox History & Physical reviewed: Yes


Concur with findings: Yes


Comments/Additional Findings: PT RETURNED TODAY FOR REHAB TREATMENT.  ALERT O X 

3. NAD.  VSS.  ADMIT TO REHAB





Inpatient Rehab Admission





- Initial Determination


Are CD services needed?: Yes


Free of communicable disease: Yes


Not in need of hospitalization: Yes





- Rehab Admission Criteria


Patient is meeting Inpatient Rehab admission criteria:: Yes

## 2018-05-15 NOTE — HP
Psychiatrist Admission





- Data


Date of interview: 05/15/18


Admission source: 3N


Identifying data: This is the second [facility] for 

this 32 years old [place]born male, homeless, unemployed and supported by 

relatives.


Medical History: Significant for low back pain and history of tonsillectomy as 

a child. Smokes cigarettes 1ppd


Psychiatric History: Denies history of previous psychiatric treatment. However 

he was seen by Dr Obando on 5/8/18 while recently in detox and at his request 

he was prescribed Trazadone 100 mg po HS for insomnia. Requests that Trazadone 

dosage be increased to 150 mg because current dosage is not effective


Physical/Sexual Abuse/Trauma History: Patient denies history of emotional, 

physical or sexual abuse.


Additional Comment: Reports history of 7 previous misdemeanor arrests. Denies 

being on probation


Vital Signs: 


 Vital Signs - 24 hr











  05/14/18 05/15/18 05/15/18





  11:51 00:30 03:30


 


Temperature 98.1 F  


 


Pulse Rate 112 H  


 


Respiratory 18 18 16





Rate   


 


Blood Pressure 126/76  














  05/15/18





  06:27


 


Temperature 97.8 F


 


Pulse Rate 84


 


Respiratory 16





Rate 


 


Blood Pressure 95/60











Allergies/Adverse Reactions: 


 Allergies











Allergy/AdvReac Type Severity Reaction Status Date / Time


 


No Known Allergies Allergy   Verified 05/14/18 13:29











Date of last physical exam: 05/08/18


Concur with the findings of this exam: Yes





- Substance Abuse/Tx History


Hx Alcohol Use: No


Hx Substance Use: Yes


Substance Use Type: Heroin (Started using heroin at age 29, consumes 5 bags 

daily. Last used on5/7/18)


Hx Substance Use Treatment: Yes (3 previous inpatient detox & one inpt rehab 

admission @ [facility])





Mental Status Exam





- Mental Status Exam


Alert and Oriented to: Time, Place, Person


Cognitive Function: Fair


Patient Appearance: Well Groomed


Mood: Depressed


Affect: Constricted


Patient Behavior: Cooperative


Speech Pattern: Clear


Voice Loudness: Normal


Thought Process: Intact, Goal Oriented


Hallucinations: Denies


Suicidal Ideation: Denies


Homicidal Ideation: Denies


Insight/Judgement: Fair


Sleep: Poorly


Appetite: Poor


Muscle strength/Tone: Normal


Gait/Station: Normal





Psychiatric Findings





- Problem List (Axis 1, 2,3)


(1) Opioid dependence


Current Visit: Yes   Status: Resolved   





(2) Nicotine dependence


Current Visit: Yes   Status: Chronic   


Qualifiers: 


   Substance use status: in withdrawal 





(3) Substance-induced sleep disorder


Current Visit: Yes   Status: Acute   





(4) Substance-induced sleep disorder


Current Visit: Yes   Status: Acute   





(5) Low back pain


Current Visit: No   Status: Chronic   


Qualifiers: 


   Chronicity: acute   Back pain laterality: right   Sciatica presence: without 

sciatica   Qualified Code(s): M54.5 - Low back pain   





- Initial Treatment Plan


Initial Treatment Plan: 1) Start Trazadone 150 mg po HS.  2) Monitor progress

## 2018-08-31 NOTE — HP
COWS





- Scale


Resting Pulse: 0= VT 80 or Below


Sweatin=Flushed/Facial Moisture


Restless Observation: 3= Extraneous Movement


Pupil Size: 2= Moderately Dilated


Bone or Joint Aches: 2= Severe Diffuse Aches


Runny Nose/ Eye Tearin= Runny Nose/Eyes


GI Upset > 30mins: 3= Vomiting/Diarrhea


Tremor Observation: 2= Slight Tremor Visible


Yawning Observation: 2= >3x During Session


Anxiety or Irritability: 2=Irritable/Anxious


Goose Flesh Skin: 0=Smooth Skin


COWS Score: 20





Admission ROS [facility]





- HPI


Chief Complaint: 





i need heip to stop using heroin,cocaine,and marijuana


Allergies/Adverse Reactions: 


 Allergies











Allergy/AdvReac Type Severity Reaction Status Date / Time


 


No Known Allergies Allergy   Verified 18 10:43











History of Present Illness: 





this 33 years old mal e with heroin,cocaine and marijuana dependence,seeking 

detox,withdrawal symptom,last treatment [facility] 18 to 18


nicotine dependence


multiple admissions in detox but keep relapsing


no significanr period of sobriety





Exam Limitations: No Limitations





- Ebola screening


Have you traveled outside of the country in the last 21 days: No


Have you been sick,other than usual withdrawal symptoms: No





- Review of Systems


Constitutional: Chills, Loss of Appetite, Malaise, Night Sweats, Changes in 

sleep, Weakness, Unintentional Wgt. Loss


EENT: reports: Tearing, Nose Congestion


Respiratory: reports: No Symptoms reported


Cardiac: reports: Palpitations


GI: reports: Diarrhea, Nausea, Vomiting, Abdominal cramping


: reports: No Symptoms Reported


Musculoskeletal: reports: Back Pain, Joint Pain, Muscle Pain, Joint Stiffness


Integumentary: reports: Dryness


Neuro: reports: Headache, Tremors


Endocrine: reports: No Symptoms Reported


Hematology: reports: No Symptoms Reported


Psychiatric: reports: Judgement Intact, Orientated x3





Patient History





- Patient Medical History


Hx Anemia: No


Hx Asthma: No


Hx Chronic Obstructive Pulmonary Disease (COPD): No


Hx Cancer: No


Hx Cardiac Disorders: No


Hx Congestive Heart Failure: No


Hx Hypertension: No


Hx Hypercholesterolemia: No


Hx Pacemaker: No


HX Cerebrovascular Accident: No


Hx Seizures: No


Hx Dementia: No


Hx Diabetes: No


Hx Gastrointestinal Disorders: No


Hx Liver Disease: No


Hx Genitourinary Disorders: No


Hx Sexually Transmitted Disorders: No


Hx Renal Disease (ESRD): No


Hx Thyroid Disease: No


Hx Human Immunodeficiency Virus (HIV): No (last 2017 negative)


Hx Hepatitis C: No


Hx Depression: No


Hx Suicide Attempt: No


Hx Bipolar Disorder: No


Hx Schizophrenia: No


Other Medical History: no sicidal,no homicidal





- Patient Surgical History


Past Surgical History: Yes


Hx Neurologic Surgery: No


Hx Cataract Extraction: No


Hx Cardiac Surgery: No


Hx Lung Surgery: No


Hx Breast Surgery: No


Hx Breast Biopsy: No


Hx Abdominal Surgery: No


Hx Appendectomy: No


Hx Cholecystectomy: No


Hx Genitourinary Surgery: No


Hx  Section: No


Hx Orthopedic Surgery: No


Other Surgical History: s/p tonsillectomy DURING CHILDHOOD


Anesthesia Reaction: No





- PPD History


Previous Implant?:  (xray done at [facility] 2018)


Documented Results: Positive w/proof


Date: 10/19/15


Results: 15mm


PPD to be Administered?: No





- Smoking Cessation


Smoking history: Current every day smoker


Have you smoked in the past 12 months: Yes


Aproximately how many cigarettes per day: 40


Cigars Per Day: 0


Hx Chewing Tobacco Use: No


Initiated information on smoking cessation: Yes


'Breaking Loose' booklet given: 18





- Substances Abused


  ** Heroin


Route: Inhalation


Frequency: Daily


Amount used: 1 bundle


Age of first use: 29


Date of Last Use: 18





  ** Cocaine


Route: Inhalation


Frequency: Daily


Amount used: 40 dollars


Age of first use: 29


Date of Last Use: 18





Family Disease History





- Family Disease History


Family Disease History: Heart Disease: Mother (, heart disease ), Other: 

Father (alive, no health problems ), Mother





Admission Physical Exam [facility]





- Vital Signs


Vital Signs: 


 Vital Signs - 24 hr











  18





  10:02


 


Temperature 98.8 F


 


Pulse Rate 66


 


Respiratory 18





Rate 


 


Blood Pressure 110/64














- Physical


General Appearance: Yes: Moderate Distress, Tremorous, Irritable, Sweating, 

Anxious


HEENTM: Yes: Normal ENT Inspection, ANDREA, Pharynx Normal


Respiratory: Yes: Lungs Clear, Normal Breath Sounds, No Respiratory Distress


Neck: Yes: Within Normal Limits, Supple, Trachea in good position


Breast: Yes: Within Normal Limits


Cardiology: Yes: Within Normal Limits, Regular Rhythm, Regular Rate, S1, S2


Abdominal: Yes: Within Normal Limits, Normal Bowel Sounds, Non Tender, Soft


Genitourinary: Yes: Within Normal Limits


Back: Yes: Muscle Spasm


Musculoskeletal: Yes: full range of Motion, Back pain, Joint Stiffness, Muscle 

Pain


Extremities: Yes: Normal Range of Motion, Tremors


Neurological: Yes: Within Normal Limits, CNs II-XII NML intact, Fully Oriented, 

Alert, Motor Strength 5/5


Integumentary: Yes: Dry


Lymphatic: Yes: Within Normal Limits





- Diagnostic


(1) Opioid dependence with withdrawal


Current Visit: No   Status: Chronic   





(2) Cocaine dependence


Current Visit: No   Status: Chronic   


Qualifiers: 


   Complication of substance-induced condition: with unspecified complication 





(3) Low back pain


Current Visit: No   Status: Chronic   


Qualifiers: 


   Chronicity: acute   Back pain laterality: right   Sciatica presence: without 

sciatica   Qualified Code(s): M54.5 - Low back pain   





(4) Nicotine dependence


Current Visit: No   Status: Chronic   


Qualifiers: 


   Substance use status: in withdrawal 





Cleared for Admission [facility]





- Detox or Rehab


[facility] Level of Care: Medically Managed


Detox Regimen/Protocol: Methadone





[facility] Breath Alcohol Content


Breath Alcohol Content: 0





Urine Drug Screen





- Results


Drug Screen Negative: No


Urine Drug Screen Results: THC-Marijuana, PABLO-Cocaine, OPI-Opiates, BZO-

Benzodiazepines, OXY-Oxycodone

## 2018-08-31 NOTE — EKG
Test Reason : 

Blood Pressure : ***/*** mmHG

Vent. Rate : 057 BPM     Atrial Rate : 057 BPM

   P-R Int : 128 ms          QRS Dur : 088 ms

    QT Int : 388 ms       P-R-T Axes : -15 065 037 degrees

   QTc Int : 377 ms

 

SINUS BRADYCARDIA

OTHERWISE NORMAL ECG

WHEN COMPARED WITH ECG OF 07-MAY-2018 21:29,

NO SIGNIFICANT CHANGE WAS FOUND

Confirmed by DARIO PAZ MD ([id number]) on 8/31/2018 3:49:03 PM

 

Referred By:             Confirmed By:DARIO PAZ MD

## 2018-09-01 NOTE — PN
[facility] COWS





- Scale


Resting Pulse: 0= HI 80 or Below


Sweatin= No chills or Flushing


Restless Observation: 0= Sits Still


Pupil Size: 0= Normal to Room Light


Bone or Joint Aches: 0= None


Runny Nose/ Eye Tearin= None


GI Upset > 30mins: 0= None


Tremor Observation of Outstretched Hands: 0= None


Yawning Observation: 0= None


Anxiety or Irritability: 0= None





[facility] Progress Note (SOAP)


Subjective: 





pt laying in bed, states everything is fine.





Obj:


 Vital Signs - 24 hr











  18





  21:04 00:30 03:30


 


Temperature 97.6 F  


 


Pulse Rate 66  


 


Respiratory 16 18 18





Rate   


 


Blood Pressure 117/77  














  18





  06:19 06:30 11:15


 


Temperature 97.7 F  98.4 F


 


Pulse Rate 57 L  72


 


Respiratory 16 18 18





Rate   


 


Blood Pressure 107/72  116/81














  18





  14:38


 


Temperature 98.4 F


 


Pulse Rate 76


 


Respiratory 18





Rate 


 


Blood Pressure 102/65








 Laboratory Tests











  18





  18:30 06:00 06:00


 


WBC   8.1 


 


RBC   4.99 


 


Hgb   15.2 


 


Hct   44.5 


 


MCV   89.1 


 


MCH   30.5 


 


MCHC   34.2 


 


RDW   13.1 


 


Plt Count   203 


 


MPV   8.3 


 


Sodium    139


 


Potassium    4.3


 


Chloride    104


 


Carbon Dioxide    31


 


Anion Gap    4 L


 


BUN    11


 


Creatinine    1.0


 


Creat Clearance w eGFR    > 60


 


Random Glucose    101  D


 


Calcium    9.1


 


Total Bilirubin    0.3


 


AST    14 L D


 


ALT    17  D


 


Alkaline Phosphatase    54


 


Total Protein    7.8


 


Albumin    4.3


 


Urine Color  Yellow  


 


Urine Appearance  Turbid  


 


Urine pH  5.0  


 


Ur Specific Gravity  1.028  


 


Urine Protein  Negative  


 


Urine Glucose (UA)  Negative  


 


Urine Ketones  Trace H  


 


Urine Blood  Negative  


 


Urine Nitrite  Negative  


 


Urine Bilirubin  Negative  


 


Urine Urobilinogen  Negative  


 


Ur Leukocyte Esterase  Negative  


 


RPR Titer   














  18





  06:00


 


WBC 


 


RBC 


 


Hgb 


 


Hct 


 


MCV 


 


MCH 


 


MCHC 


 


RDW 


 


Plt Count 


 


MPV 


 


Sodium 


 


Potassium 


 


Chloride 


 


Carbon Dioxide 


 


Anion Gap 


 


BUN 


 


Creatinine 


 


Creat Clearance w eGFR 


 


Random Glucose 


 


Calcium 


 


Total Bilirubin 


 


AST 


 


ALT 


 


Alkaline Phosphatase 


 


Total Protein 


 


Albumin 


 


Urine Color 


 


Urine Appearance 


 


Urine pH 


 


Ur Specific Gravity 


 


Urine Protein 


 


Urine Glucose (UA) 


 


Urine Ketones 


 


Urine Blood 


 


Urine Nitrite 


 


Urine Bilirubin 


 


Urine Urobilinogen 


 


Ur Leukocyte Esterase 


 


RPR Titer  Nonreactive








labs and VS WNL





Ass: pt here for heroin detox: continue methadone detox protocol

## 2018-09-02 NOTE — DS
[facility] Detox Discharge Summary


Admission Date: 


08/31/18





Discharge Date: 09/02/18





- History


Present History: Cocaine Dependence, Opioid Dependence


Additional Comments: 





PT SIGNED OUT AMA


Pertinent Past History: 





PLEASE SEE DX BELOW





- Physical Exam Results


Vital Signs: 


 Vital Signs











Temperature  97.0 F L  09/02/18 11:05


 


Pulse Rate  77   09/02/18 11:05


 


Respiratory Rate  18 09/02/18 11:05


 


Blood Pressure  118/74   09/02/18 11:05


 


O2 Sat by Pulse Oximetry (%)      











Pertinent Admission Physical Exam Findings: 





WITHDRAWAL SX


 Laboratory Tests











  08/31/18 09/01/18 09/01/18





  18:30 06:00 06:00


 


WBC   8.1 


 


RBC   4.99 


 


Hgb   15.2 


 


Hct   44.5 


 


MCV   89.1 


 


MCH   30.5 


 


MCHC   34.2 


 


RDW   13.1 


 


Plt Count   203 


 


MPV   8.3 


 


Sodium    139


 


Potassium    4.3


 


Chloride    104


 


Carbon Dioxide    31


 


Anion Gap    4 L


 


BUN    11


 


Creatinine    1.0


 


Creat Clearance w eGFR    > 60


 


Random Glucose    101  D


 


Calcium    9.1


 


Total Bilirubin    0.3


 


AST    14 L D


 


ALT    17  D


 


Alkaline Phosphatase    54


 


Total Protein    7.8


 


Albumin    4.3


 


Urine Color  Yellow  


 


Urine Appearance  Turbid  


 


Urine pH  5.0  


 


Ur Specific Gravity  1.028  


 


Urine Protein  Negative  


 


Urine Glucose (UA)  Negative  


 


Urine Ketones  Trace H  


 


Urine Blood  Negative  


 


Urine Nitrite  Negative  


 


Urine Bilirubin  Negative  


 


Urine Urobilinogen  Negative  


 


Ur Leukocyte Esterase  Negative  


 


RPR Titer   














  09/01/18





  06:00


 


WBC 


 


RBC 


 


Hgb 


 


Hct 


 


MCV 


 


MCH 


 


MCHC 


 


RDW 


 


Plt Count 


 


MPV 


 


Sodium 


 


Potassium 


 


Chloride 


 


Carbon Dioxide 


 


Anion Gap 


 


BUN 


 


Creatinine 


 


Creat Clearance w eGFR 


 


Random Glucose 


 


Calcium 


 


Total Bilirubin 


 


AST 


 


ALT 


 


Alkaline Phosphatase 


 


Total Protein 


 


Albumin 


 


Urine Color 


 


Urine Appearance 


 


Urine pH 


 


Ur Specific Gravity 


 


Urine Protein 


 


Urine Glucose (UA) 


 


Urine Ketones 


 


Urine Blood 


 


Urine Nitrite 


 


Urine Bilirubin 


 


Urine Urobilinogen 


 


Ur Leukocyte Esterase 


 


RPR Titer  Nonreactive














- Treatment


Hospital Course: Discharged Condition Good





- Diagnosis


(1) Nicotine dependence


Status: Acute   


Qualifiers: 


   Substance use status: in withdrawal 





(2) Opioid dependence with withdrawal


Status: Acute   





(3) Cocaine dependence, uncomplicated


Status: Acute   





- AMA


Did Patient Leave Against Medical Advice: Yes (AMA)

## 2018-09-02 NOTE — PN
[facility] COWS





- Scale


Resting Pulse: 1= NV 


Sweatin= No chills or Flushing


Restless Observation: 1= Difficult to Sit Still


Pupil Size: 0= Normal to Room Light


Bone or Joint Aches: 1= Mild Discomfort


Runny Nose/ Eye Tearin= Nasal Congestion


GI Upset > 30mins: 1= Stomach Cramp


Tremor Observation of Outstretched Hands: 1= Tremor Felt, Not Seen


Yawning Observation: 0= None


Anxiety or Irritability: 0= None


Goose Flesh Skin: 0=Smooth Skin


COWS Score: 6





[facility] Progress Note (SOAP)


Subjective: 





pt states feeling fine overall, 2nd day in detox





O:


 Vital Signs - 24 hr











  18





  11:15 14:38 19:02


 


Temperature 98.4 F 98.4 F 98.4 F


 


Pulse Rate 72 76 76


 


Respiratory 18 18 18





Rate   


 


Blood Pressure 116/81 102/65 96/58














  18





  22:30 00:30 03:30


 


Temperature   


 


Pulse Rate 72  


 


Respiratory 18 18 18





Rate   


 


Blood Pressure 110/70  














  18





  06:01


 


Temperature 97.5 F L


 


Pulse Rate 56 L


 


Respiratory 16





Rate 


 


Blood Pressure 96/63








 Laboratory Tests











  18





  18:30 06:00 06:00


 


WBC   8.1 


 


RBC   4.99 


 


Hgb   15.2 


 


Hct   44.5 


 


MCV   89.1 


 


MCH   30.5 


 


MCHC   34.2 


 


RDW   13.1 


 


Plt Count   203 


 


MPV   8.3 


 


Sodium    139


 


Potassium    4.3


 


Chloride    104


 


Carbon Dioxide    31


 


Anion Gap    4 L


 


BUN    11


 


Creatinine    1.0


 


Creat Clearance w eGFR    > 60


 


Random Glucose    101  D


 


Calcium    9.1


 


Total Bilirubin    0.3


 


AST    14 L D


 


ALT    17  D


 


Alkaline Phosphatase    54


 


Total Protein    7.8


 


Albumin    4.3


 


Urine Color  Yellow  


 


Urine Appearance  Turbid  


 


Urine pH  5.0  


 


Ur Specific Gravity  1.028  


 


Urine Protein  Negative  


 


Urine Glucose (UA)  Negative  


 


Urine Ketones  Trace H  


 


Urine Blood  Negative  


 


Urine Nitrite  Negative  


 


Urine Bilirubin  Negative  


 


Urine Urobilinogen  Negative  


 


Ur Leukocyte Esterase  Negative  


 


RPR Titer   














  18





  06:00


 


WBC 


 


RBC 


 


Hgb 


 


Hct 


 


MCV 


 


MCH 


 


MCHC 


 


RDW 


 


Plt Count 


 


MPV 


 


Sodium 


 


Potassium 


 


Chloride 


 


Carbon Dioxide 


 


Anion Gap 


 


BUN 


 


Creatinine 


 


Creat Clearance w eGFR 


 


Random Glucose 


 


Calcium 


 


Total Bilirubin 


 


AST 


 


ALT 


 


Alkaline Phosphatase 


 


Total Protein 


 


Albumin 


 


Urine Color 


 


Urine Appearance 


 


Urine pH 


 


Ur Specific Gravity 


 


Urine Protein 


 


Urine Glucose (UA) 


 


Urine Ketones 


 


Urine Blood 


 


Urine Nitrite 


 


Urine Bilirubin 


 


Urine Urobilinogen 


 


Ur Leukocyte Esterase 


 


RPR Titer  Nonreactive








nl VS/Labs





A/p: continue opioid detox protocol

## 2018-09-02 NOTE — PN
[facility] Progress Note


Note: 





PT SIGNED OUT AMA STATING HE WANTS TO GO HOME TO SLEEP AND GET READY FOR  HIS 

WORK. REPORTS HE IS NOT READY TO CONTINUE TREATMENT AT THIS TIME. ALL EFFORTS 

TO ENCOURAGE PATIENT WAS UNSUCCESSFUL AS PATIENT WAS VERY PLEASANT AND SAID 'I'

M JUST BEING SINCERE WITH YOU, I'LL COME BACK SOME OTHER TIME", PT  DID NOT 

WANT ANY FURTHER PERSUASIONS. ALERT O X 3. NAD.

## 2019-07-01 NOTE — HP
CIWA Score





- Admission Criteria


[facility] Guidelines: Admission for Medically Managed Detox: 


Requires at least one of the followin. CIWA greater than 12


2. Seizures within the past 24 hours


3. Delirium tremens within the past 24 hours


4. Hallucinations within the past 24 hours


5. Acute intervention needed for co  occurring medical disorder


6. Acute intervention needed for co  occurring psychiatric disorder


7. Severe withdrawal that cannot be handled at a lower level of care (continued


    vomiting, continued diarrhea, abnormal vital signs) requiring intravenous


    medication and/or fluids


8. Pregnancy








Admission ROS S





- [facility]


Chief Complaint: 





Here for rehab. I just completed detox. 


Allergies/Adverse Reactions: 


 Allergies











Allergy/AdvReac Type Severity Reaction Status Date / Time


 


No Known Allergies Allergy   Verified 19 17:09











History of Present Illness: 





32 yo presents w/post completion of detox at [facility] today and here for rehab. 

Patient states completed 6 nights for heroin and Xanax use disorder. 


Still c/o watery diarrhea, muscle stiffness, nausea, and lack of appetite. 





Heroin use since 28. Recent use abou2 2-4 bundles daily - nasal. States is 

really motivated to stay sober. 


Methadone (from detox)


Benzo(Xanax) use began at age [age]. Was up to 6 mg/day (Would mix w/ heroin).





U-tox + for BZO, FEN, OPI, MTD


YANIRA = 0.








PMHx: Denies significant PMH


Hx TB immunization as a child. Hx + PPD. 


18 @ [facility]: EKG: NSB;


18 @ [facility]: CXR: W/o evidence of disease.








MHHx: Depression. Denies thoughts of harming self or others.








Search Terms: Liborio Taveras, 1985 


Search Date: 2019 06:23:21 PM 


The Drug Utilization Report below displays all of the controlled substance 

prescriptions, if any, that your patient has filled in the last twelve months. 

The information displayed on this report is compiled from pharmacy submissions 

to the Department, and accurately reflects the information as submitted by the 

pharmacies.


This report was requested by: Dania Gaspar | Reference #: [id number] 


There are no results for the search terms that you entered.





Search Terms: Liborio Taveras, 1985 


Search Date: 2019 06:24:16 PM 


States Searched: [place], [place], [place], [place], [place], [place], [place] 


The Drug Utilization Report below displays the controlled substance 

prescriptions, if any, that were dispensed in the indicated state(s). The 

information displayed on this report is compiled from requests submitted to 

other states' PMPs, and accurately reflects the information as returned by 

them. Blank fields indicate data not provided by other state.


This report was requested by: Dania Gaspar | Reference #: [id number] 





        


Exam Limitations: No Limitations





- Ebola screening


Have you traveled outside of the country in the last 21 days: No


Have you had contact with anyone from an Ebola affected area: No


Have you been sick,other than usual withdrawal symptoms: No (Denies recent 

exposure to measles)


Do you have a fever: No





- Review of Systems


Constitutional: Diaphoresis, Changes in sleep (Difficulty fallinh asleep)


EENT: reports: No Symptoms Reported


Respiratory: reports: No Symptoms reported


Cardiac: reports: No Symptoms Reported


GI: reports: Diarrhea (Watery, brown diarrhea x 6), Abdominal cramping (worse 

today)


: reports: Burning (Burning at end of urination)


Musculoskeletal: reports: Back Pain (back pain r/t withdrawal), Joint Pain ( 

pain r/t withdrawal), Muscle Pain (Generalized  pain r/t withdrawal)


Neuro: reports: Tremors


Endocrine: reports: No Symptoms Reported


Hematology: reports: No Symptoms Reported


Psychiatric: reports: Judgement Intact, Orientated x3, Anxious, Depressed (

Denies thoughts of harming self or others)





Patient History





- Patient Medical History


Hx Anemia: No


Hx Asthma: No


Hx Chronic Obstructive Pulmonary Disease (COPD): No


Hx Cancer: No


Hx Cardiac Disorders: No


Hx Congestive Heart Failure: No


Hx Hypertension: No


Hx Hypercholesterolemia: No


Hx Pacemaker: No


HX Cerebrovascular Accident: No


Hx Seizures: No


Hx Dementia: No


Hx Diabetes: No


Hx Gastrointestinal Disorders: No


Hx Liver Disease: No


Hx Genitourinary Disorders: No


Hx Sexually Transmitted Disorders: No


Hx Renal Disease (ESRD): No


Hx Thyroid Disease: No


Hx Human Immunodeficiency Virus (HIV): No (last 2017 negative)


Hx Hepatitis C: No


Hx Depression: No


Hx Suicide Attempt: No


Hx Bipolar Disorder: No


Hx Schizophrenia: No





- Patient Surgical History


Past Surgical History: Yes


Hx Neurologic Surgery: No


Hx Cataract Extraction: No


Hx Cardiac Surgery: No


Hx Lung Surgery: No


Hx Breast Surgery: No


Hx Breast Biopsy: No


Hx Abdominal Surgery: No


Hx Appendectomy: No


Hx Cholecystectomy: No


Hx Genitourinary Surgery: No


Hx  Section: No


Hx Orthopedic Surgery: No


Other Surgical History: s/p tonsillectomy DURING CHILDHOOD


Anesthesia Reaction: No





- PPD History


Previous Implant?: Yes


Documented Results: Positive w/o proof


Implanted On Prior [facility] Admission?: Yes


Date: 10/19/15


Results: 15mm


PPD to be Administered?: No





- Smoking Cessation


Smoking history: Current every day smoker


Have you smoked in the past 12 months: Yes


Aproximately how many cigarettes per day: 40


Cigars Per Day: 0


Hx Chewing Tobacco Use: No


Initiated information on smoking cessation: Yes


'Breaking Loose' booklet given: 19





- Substance & Tx. History


Hx Alcohol Use: Yes ( 1 beer/mth)


Hx Substance Use: Yes


Substance Use Type: Heroin, Opiates, Tranquilizers (Xanas)


Hx Substance Use Treatment: Yes (detox, rehab)





- Substances abused


  ** Heroin


Other (specify): SNIFF


Frequency: Daily


Amount used: 2 GRAM


Age of first use: 28


Date of last use: 19





  ** Alprazolam (Xanax)


Substance route: Oral


Frequency: Daily


Amount used: 6 MG


Age of first use: 28


Date of last use: 19





Family Disease History





- Family Disease History


Family Disease History: Heart Disease: Mother (, heart disease ), Other: 

Father (alive, no health problems ), Mother





Admission Physical Exam [facility]





- Vital Signs


Vital Signs: 


 Vital Signs - 24 hr











  19





  17:09


 


Temperature 97.1 F L


 


Pulse Rate 71


 


Respiratory 18





Rate 


 


Blood Pressure 105/66














- Physical


General Appearance: Yes: Mild Distress, Thin, Tremorous (Tremors of hands at 

rest), Anxious


HEENTM: Yes: EOMI, Hearing grossly Normal, Normocephalic, Normal Voice, ANDREA (

Pupils = 4 mm), Pharynx Normal


Respiratory: Yes: Lungs Clear, Normal Breath Sounds, No Respiratory Distress


Neck: Yes: No masses,lesions,Nodules, Supple


Breast: Yes: Breast Exam Deferred


Cardiology: Yes: Regular Rhythm, Regular Rate, S1, S2


Abdominal: Yes: Flat, Soft, Increased Bowel Sounds


Genitourinary: Yes: Burning


Back: Yes: Normal Inspection


Musculoskeletal: Yes: full range of Motion, Gait Steady


Extremities: Yes: Tremors (Tremors of hands at rest)


Neurological: Yes: CNs II-XII NML intact, Fully Oriented, Motor Strength 5/5


Integumentary: Yes: Normal Color, Warm


Lymphatic: Yes: Within Normal Limits





- Diagnostic


(1) Severe heroin dependence in early remission


Current Visit: Yes   Status: Acute   





(2) Sedative, hypnotic or anxiolytic use disorder, moderate, in early remission

, dependence


Current Visit: Yes   Status: Acute   





(3) History of positive PPD


Current Visit: Yes   Status: Chronic   Comment: r/t immunization as a child   





(4) Insomnia


Current Visit: Yes   Status: Chronic   


Qualifiers: 


   Insomnia type: unspecified   Qualified Code(s): G47.00 - Insomnia, 

unspecified   





(5) Nicotine dependence


Current Visit: Yes   Status: Chronic   


Qualifiers: 


   Nicotine product type: cigarettes   Substance use status: uncomplicated   

Qualified Code(s): F17.210 - Nicotine dependence, cigarettes, uncomplicated   





Cleared for Admission [facility]





- Detox or Rehab


Claeared for Rehab Admission: Yes





Inpatient Rehab Admission





- Rehab Decision to Admit


Inpatient rehab admission?: Yes





- Initial Determination


Are CD services needed?: Yes


Free of communicable disease: Yes


Not in need of hospitalization: Yes





- Rehab Admission Criteria


Previous failed treatment: Yes


Poor recovery environment: Yes


Comorbidities: No


Lacks judgement: No


Patient is meeting Inpatient Rehab admission criteria:: Yes

## 2019-07-02 NOTE — CONSULT
[facility] Psychiatric Consult





- Data


Date of interview: 07/02/19


Admission source: [facility]


Identifying data: Patient is a 33 year old single male, without children, 

unemployed, domiciled, and is financially supported by family. This is one of 

multiple admissions to rehab. Patient admitted to  rehab for opiate and 

benzodiazepine dependence.


Substance Abuse History: Smoking Cessation.  Smoking history: Current every day 

smoker.  Have you smoked in the past 12 months: Yes.  Aproximately how many 

cigarettes per day: 40.  Cigars Per Day: 0.  Hx Chewing Tobacco Use: No.  

Initiated information on smoking cessation: Yes.  'Breaking Loose' booklet given

: 07/01/19.  - Substance & Tx. History.  Hx Alcohol Use: Yes ( 1 beer/mth).  Hx 

Substance Use: Yes.  Substance Use Type: Heroin, Opiates, Tranquilizers (Xanas)

.  Hx Substance Use Treatment: Yes (detox, rehab).  - Substances abused.  ** 

Heroin.  Other (specify): SNIFF.  Frequency: Daily.  Amount used: 2 GRAM.  Age 

of first use: 28.  Date of last use: 06/26/19.  ** Alprazolam (Xanax).  

Substance route: Oral.  Frequency: Daily.  Amount used: 6 MG.  Age of first use

: 28.  Date of last use: 06/26/19


Medical History: Hx + PPD.


Psychiatric History: Patient denies h/o psychiatric hospitalization, outpatient 

care and suicide attempt. States his only psychiatric contact have occured at 

detox/rehab settings. Reports past history of accepting trazodone 150mg for 

insomnia. At present patient is experencing difficulty sleeping.


Physical/Sexual Abuse/Trauma History: denies.





Mental Status Exam





- Mental Status Exam


Alert and Oriented to: Time, Place, Person


Cognitive Function: Good


Patient Appearance: Well Groomed


Mood: Euthymic


Affect: Appropriate


Patient Behavior: Appropriate, Cooperative


Speech Pattern: Appropriate


Voice Loudness: Mildly Soft/Quiet


Thought Process: Goal Oriented


Thought Disorder: Not Present


Hallucinations: Denies


Suicidal Ideation: Denies


Homicidal Ideation: Denies


Insight/Judgement: Poor


Sleep: Poorly


Appetite: Fair


Muscle strength/Tone: Normal


Gait/Station: Normal





Psychiatric Findings





- Problem List (Axis 1, 2,3)


(1) Opiate dependence


Current Visit: Yes   Status: Acute   





(2) Substance-induced sleep disorder


Current Visit: Yes   Status: Acute   





(3) Sedative hypnotic or anxiolytic dependence


Current Visit: Yes   Status: Acute   





- Initial Treatment Plan


Initial Treatment Plan: Psychoeducation provided. Rehab in progress. Will order 

Trazodone 150mg HS. Benefits and sdie effects discussed. Verbal consent given.

## 2019-07-03 NOTE — PN
[facility] COWS





- Scale


Resting Pulse: 0= [place] 80 or Below


Sweatin=Flushed/Facial Moisture


Restless Observation: 1= Difficult to Sit Still


Pupil Size: 2= Moderately Dilated


Bone or Joint Aches: 1= Mild Discomfort


Runny Nose/ Eye Tearin= None


GI Upset > 30mins: 1= Stomach Cramp


Tremor Observation of Outstretched Hands: 0= None


Yawning Observation: 0= None


Anxiety or Irritability: 2=Irritable/Anxious


Goose Flesh Skin: 0=Smooth Skin


COWS Score: 9





BHS Progress Note (SOAP)


Subjective: 


PATIENT SEEN FOR C/O WITHDRAWAL SYMPTOMS OF OPIOD CRAVINGS, ANXIETY, CHILLS, 

SWEATING AND STOMACH CRAMPS. PATIENT HAS HX OF HERION/XANAX DEPENDENCE. DETOXED 

AT [facility] AND ADMITTED TO [facility] ON 19 FOR REHAB. PATIENT STATES HIS GOAL IS TO 

BE OFF ALL SUBSTANCES BUT NEEDS HELP WITH WITHDRAWAL SYMPTOMS. 





ISTOP REVIEWED UPON ADMISSION AND NEGATIVE 19.


Objective: 





19 16:34


 Vital Signs











Temperature  98.0 F   19 06:35


 


Pulse Rate  69   19 06:35


 


Respiratory Rate  16   19 06:35


 


Blood Pressure  105/68   19 06:35


 


O2 Sat by Pulse Oximetry (%)      








 Laboratory Tests











  19





  08:35 08:35 08:35


 


WBC  6.7  


 


RBC  4.69  


 


Hgb  14.1  


 


Hct  41.6  


 


MCV  88.7  


 


MCH  30.0  


 


MCHC  33.9  


 


RDW  13.4  


 


Plt Count  213  


 


MPV  7.8  


 


Sodium   139 


 


Potassium   3.9 


 


Chloride   104 


 


Carbon Dioxide   29 


 


Anion Gap   5 L 


 


BUN   13.7 


 


Creatinine   1.1 


 


Est GFR (CKD-EPI)AfAm   102.40 


 


Est GFR (CKD-EPI)NonAf   88.36 


 


Random Glucose   90 


 


Calcium   9.0 


 


Total Bilirubin   0.7 


 


AST   9 L 


 


ALT   13 


 


Alkaline Phosphatase   43 L 


 


Total Protein   6.9 


 


Albumin   4.0 


 


Urine Color   


 


Urine Appearance   


 


Urine pH   


 


Ur Specific Gravity   


 


Urine Protein   


 


Urine Glucose (UA)   


 


Urine Ketones   


 


Urine Blood   


 


Urine Nitrite   


 


Urine Bilirubin   


 


Urine Urobilinogen   


 


Ur Leukocyte Esterase   


 


RPR Titer    Nonreactive














  19





  11:00


 


WBC 


 


RBC 


 


Hgb 


 


Hct 


 


MCV 


 


MCH 


 


MCHC 


 


RDW 


 


Plt Count 


 


MPV 


 


Sodium 


 


Potassium 


 


Chloride 


 


Carbon Dioxide 


 


Anion Gap 


 


BUN 


 


Creatinine 


 


Est GFR (CKD-EPI)AfAm 


 


Est GFR (CKD-EPI)NonAf 


 


Random Glucose 


 


Calcium 


 


Total Bilirubin 


 


AST 


 


ALT 


 


Alkaline Phosphatase 


 


Total Protein 


 


Albumin 


 


Urine Color  Yellow


 


Urine Appearance  Clear


 


Urine pH  6.5  D


 


Ur Specific Gravity  1.017


 


Urine Protein  Negative


 


Urine Glucose (UA)  Negative


 


Urine Ketones  Trace H


 


Urine Blood  Negative


 


Urine Nitrite  Negative


 


Urine Bilirubin  Negative


 


Urine Urobilinogen  0.2


 


Ur Leukocyte Esterase  Negative


 


RPR Titer 








PE:





ALERT AND ORIENTED X 3


SKIN WARM, + BEADS OF SWEAT OF FOREHEAD


+PERRLA EOMS INTACT, BL, PUPILS MILDLY DILATED


EXT NO VISIBLE TREMORS


ANXIOUS/ +RESTLESSNESS











Assessment: 





19 16:37


A/P:





PROTRACTED WITHDRAWAL





Plan: 





PATIENT REFUSED CONNECTION TO LONG TERM SUBOXONE MAT HOWEVER, WILL TREAT WITH 

SUBOXONE 2MG SL DAILY PRN X 72 HRS- FIRST DOSE NOW FOR WITHDRAWAL SYMPTOMS


CONTINUE MOTRIN PRN


CLONIDINE NOT ORDERED DUE TO LOW BP


CONTINUE TO MONITOR CLINICALLY


19 16:38

## 2019-07-04 NOTE — PN
[facility] Progress Note (SOAP)


Subjective: 





PT DECLINED TO CONTINUE WITH REHAB  AND REFUSED TO WAIT FOR  PROVIDER  OR THE 

COUNSELOR TO SEE HIM STATING "I DON'T WANT TO SEE ANYBODY". 


Objective: 





07/04/19 15:53


 Vital Signs - 24 hr











  07/04/19 07/04/19 07/04/19





  00:30 03:30 06:47


 


Temperature   97.9 F


 


Pulse Rate   64


 


Respiratory 18 18 16





Rate   


 


Blood Pressure   90/61











Assessment: 





07/04/19 15:53


NAD


Plan: 





PT SIGNED OUT AMA.


PT WAS REFERRED TO [facility] FOR CD AFTERCARE FOR FOLLOW UP.

## 2020-10-16 NOTE — BHS.RME
2019 N Coronavirus Screen





- COVID-19 Screening Questions


Dx of COVID-19 or had a positive test in the last 4 weeks?: No


Contact with known/suspected COVID patient in last 14 days?: No


Any of these symptoms or contact with someone who has?: None


Traveled domestically/internationally in the last 14 days?: No


Screen score: 0


Screen result: Further Evaluation





Substance Use & Tx History





- Substance Use History


  ** Heroin


Substance amount: 2 bundles


Frequency of use: Daily


Substance route: Inhalation (ex: sniffing or snorting)


Date of Last Use: 10/16/20 (started age 27)





  ** Cocaine-Crack


Substance amount: $60


Frequency of use: Daily


Substance route: Smoking


Date of Last Use: 10/12/20 (started age 27)





  ** Nicotine


Substance amount: 2 packs


Frequency of use: Daily


Substance route: Smoking


Date of Last Use: 10/16/20 (started age 15)





Physical/Psych/Mental Status





- Behavior


General Behavior: Increased activity (restlessness, agitation)


Eye Contact: Normal





- Cooperativeness


Cooperativeness: Cooperative





- Thinking


Thought Processes: Tight, Logical, Goal Directed





- Physical Health Problems


Is patient presently having any pain?: No


Does patient presently have any injuries (include location): No


Does patient currently have a fever: No


Is patient pregnant: No





COWS





- Scale


Resting Pulse: 0= [place] 80 or Below


Sweatin= Chills/Flushing


Restless Observation: 3= Extraneous Movement


Pupil Size: 1= Pupils >than Normal


Bone or Joint Aches: 1= Mild Discomfort


Runny Nose/ Eye Tearin= Runny Nose/Eyes


GI Upset > 30mins: 1= Stomach Cramp


Tremor Observation: 2= Slight Tremor Visible


Yawning Observation: 1= 1-2x During Session


Anxiety or Irritability: 1=Feels Anxious/Irritable


Goose Flesh Skin: 0=Smooth Skin


COWS Score: 13

## 2020-10-16 NOTE — HP
COWS





- Scale


Resting Pulse: 0= DC 80 or Below


Sweatin=Flushed/Facial Moisture


Restless Observation: 3= Extraneous Movement


Pupil Size: 1= Pupils >than Normal (Pupils = 3 mm)


Bone or Joint Aches: 1= Mild Discomfort


Runny Nose/ Eye Tearin= Runny Nose/Eyes


GI Upset > 30mins: 0= None


Tremor Observation: 2= Slight Tremor Visible


Yawning Observation: 0= None


Anxiety or Irritability: 1=Feels Anxious/Irritable


Goose Flesh Skin: 0=Smooth Skin


COWS Score: 12





CIWA Score





- Admission Criteria


[facility] Guidelines: Admission for Medically Managed Detox: 


Requires at least one of the followin. CIWA greater than 12


2. Seizures within the past 24 hours


3. Delirium tremens within the past 24 hours


4. Hallucinations within the past 24 hours


5. Acute intervention needed for co  occurring medical disorder


6. Acute intervention needed for co  occurring psychiatric disorder


7. Severe withdrawal that cannot be handled at a lower level of care (continued


    vomiting, continued diarrhea, abnormal vital signs) requiring intravenous


    medication and/or fluids


8. Pregnancy








Admitting History and Physical





- Smoking History


Smoking history: Current every day smoker


Have you smoked in the past 12 months: Yes


Aproximately how many cigarettes per day: 80





- Alcohol/Substance Use


Hx Alcohol Use: Yes ( 1 beer/mth)





Admission [facility]





- [facility]


Chief Complaint: 





Here to stop heroin because it's affecting my health. 


Allergies/Adverse Reactions: 


                                    Allergies











Allergy/AdvReac Type Severity Reaction Status Date / Time


 


No Known Allergies Allergy   Verified 10/16/20 18:01











History of Present Illness: 


 34 yo presents w/ symptoms opioid withdrawal seeking detox. 





U-tox + FEN, PABLO, MTD


YANIRA = 0.0





DENIES SEIZURES OR OVERDOSES.


BLACKOUTS 1-2 X. LAST 6 MTHS AGO. 





Heroin use since 27. Recent use abou2 15 bags daily - nasal. Has a Narcan kit.


Methadone -states purchases from streets. (50-100mg). States last used 3 days 

ago.


States uses Xanax but none in urine use began at age [age]. Was up to 2-4 

mg/day. States last used 4 days ago. 


Cocaine use since age 27. $60/day. 


Nicotine use since age 15. Smokes 2 PPD.





Alcohol - denies. 





PMHx: Denies significant PMH; Intermittent sharp pain (R) finger. 


Hx TB immunization as a child. + PPD.   Last CXR: 2020





MHHx: Depression - r/t withdrawal symptoms. Denies thoughts of harming self or 

others.


SHx: Domiciled. Unemployed. Denies legal. 





Search Terms: Liborio Taveras, 1985 


Search Date: 10/16/2020 17:06:44 PM 


The Drug Utilization Report below displays all of the controlled substance 

prescriptions, if any, that your patient has filled in the last twelve months. 

The information displayed on this report is compiled from pharmacy submissions 

to the Department, and accurately reflects the information as submitted by the 

pharmacies.


This report was requested by: Dania Gaspar | Reference #: [id number] 


There are no results for the search terms that you entered.





Exam Limitations: No Limitations





- Ebola screening


Have you traveled outside of the country in the last 21 days: No (Denies COVID 

exposure)


Have you had contact with anyone from an Ebola affected area: No


Have you been sick,other than usual withdrawal symptoms: No


Do you have a fever: No





- Review of Systems


Constitutional: Chills, Diaphoresis, Changes in sleep (Difficulty falling and 

staying asleep.), Unintentional Wgt. Loss


EENT: reports: Nose Congestion


Respiratory: reports: Shortness of Breath (r/t withdrawal)


Cardiac: reports: No Symptoms Reported


GI: reports: No Symptoms Reported


: reports: No Symptoms Reported


Musculoskeletal: reports: Back Pain (r/t withdrawal), Other (Intermittent sharp 

pain (R) finger.)


Integumentary: reports: No Symptoms Reported


Neuro: reports: Tremors


Endocrine: reports: Increased Thirst


Hematology: reports: No Symptoms Reported


Psychiatric: reports: Judgement Intact, Orientated x3, Agitated, Anxious, 

Depressed (r/t withdrawals)





Patient History





- Patient Medical History


Hx Anemia: No


Hx Asthma: No


Hx Chronic Obstructive Pulmonary Disease (COPD): No


Hx Cancer: No


Hx Cardiac Disorders: No


Hx Congestive Heart Failure: No


Hx Hypertension: No


Hx Hypercholesterolemia: No


Hx Pacemaker: No


HX Cerebrovascular Accident: No


Hx Seizures: No


Hx Dementia: No


Hx Diabetes: No


Hx Gastrointestinal Disorders: No


Hx Liver Disease: No


Hx Genitourinary Disorders: No


Hx Sexually Transmitted Disorders: No


Hx Renal Disease (ESRD): No


Hx Thyroid Disease: No


Hx Human Immunodeficiency Virus (HIV): No (last 2017 negative)


Hx Hepatitis C: No


Hx Depression: No


Hx Suicide Attempt: No


Hx Bipolar Disorder: No


Hx Schizophrenia: No





- Patient Surgical History


Past Surgical History: Yes


Hx Neurologic Surgery: No


Hx Cataract Extraction: No


Hx Cardiac Surgery: No


Hx Lung Surgery: No


Hx Breast Surgery: No


Hx Breast Biopsy: No


Hx Abdominal Surgery: No


Hx Appendectomy: No


Hx Cholecystectomy: No


Hx Genitourinary Surgery: No


Hx  Section: No


Hx Orthopedic Surgery: No


Other Surgical History: s/p tonsillectomy DURING CHILDHOOD


Anesthesia Reaction: No





- PPD History


Previous Implant?: Yes


Documented Results: Positive w/proof


Implanted On [facility]?: No


Date: 10/19/15


Results: 15mm


PPD to be Administered?: No





- Smoking Cessation


Smoking history: Current every day smoker


Have you smoked in the past 12 months: Yes


Aproximately how many cigarettes per day: 40


Cigars Per Day: 0


Hx Chewing Tobacco Use: No


Initiated information on smoking cessation: Yes


'Breaking Loose' booklet given: 10/16/20





- Substance & Tx. History


Hx Alcohol Use: No


Hx Substance Use: Yes


Substance Use Type: Cocaine, Heroin, Opiates, Tranquilizers (Benzo's)


Hx Substance Use Treatment: Yes (detox, rehab, past [facility]MTP)





Admission Physical Exam [facility]





- Physical


General Appearance: Yes: Nourished, Mild Distress, Tremorous, Sweating 

(Increased facial moisture), Anxious


HEENTM: Yes: EOMI, Hearing grossly Normal, Normocephalic, Normal Voice, ANDREA 

(Pupils = 3 mm), Pharynx Normal, Nasal Congestion, Rhinorrhea


Respiratory: Yes: Lungs Clear, Normal Breath Sounds, No Respiratory Distress


Neck: Yes: No masses,lesions,Nodules, Supple


Breast: Yes: Breast Exam Deferred


Cardiology: Yes: Regular Rhythm, Regular Rate, S1, S2


Abdominal: Yes: Non Tender, Flat, Soft, Increased Bowel Sounds


Genitourinary: Yes: Within Normal Limits


Back: Yes: Normal Inspection


Musculoskeletal: Yes: full range of Motion, Gait Steady, Other ((R) hand/pinky 

w/ FROM, no edema)


Extremities: Yes: Normal Capillary Refill, Tremors


Neurological: Yes: CNs II-XII NML intact, Fully Oriented, Alert, Motor Strength 

5/5, Normal Mood/Affect


Integumentary: Yes: Normal Color, Warm, Moist (Increased facial moisture)


Lymphatic: Yes: Within Normal Limits





- Diagnostic


(1) Opioid dependence with withdrawal


Current Visit: Yes   Status: Acute   





(2) Sedative, hypnotic or anxiolytic use disorder, moderate, in early remission,

dependence


Current Visit: Yes   Status: Acute   





(3) Cocaine dependence, uncomplicated


Current Visit: Yes   Status: Chronic   





(4) History of positive PPD


Current Visit: Yes   Status: Chronic   Comment: r/t immunization as a child   





(5) Nicotine dependence


Current Visit: Yes   Status: Chronic   


Qualifiers: 


   Nicotine product type: cigarettes   Substance use status: uncomplicated   

Qualified Code(s): F17.210 - Nicotine dependence, cigarettes, uncomplicated   





Cleared for Admission S





- Detox or Rehab


S Level of Care: Medically Managed


Detox Regimen/Protocol: Methadone


Claeared for Rehab Admission: No





Breathalyzer





- Breathalyzer


Breathalyzer: 0





Urine Drug Screen





- Test Device


Lot number: [id number]


Expiration date: 22





- Control


Is test valid?: Yes





- Results


Drug screen NEGATIVE: No


Urine drug screen results: PABLO-Cocaine, FEN-Fentanyl, MOP-Opiates





Inpatient Rehab Admission





- Rehab Decision to Admit


Inpatient rehab admission?: No

## 2020-10-16 NOTE — XMS
Summarization Of Episode

                           Created on:2020



Patient:IMTIAZ MATUTE

Sex:Male

:1985

External Reference #:[id number]





Demographics







                          Address                   [address]



                                                    [place] [id number]

 

                          Home Phone                [phone]

 

                          Email Address             N

 

                          Preferred Language        English

 

                          Marital Status            Not  or 

 

                          Church Affiliation     Rastafari

 

                          Race                      WH

 

                          Ethnic Group              Not  or 









Author







                          Organization              [facility]









Support







                Name            Relationship    Address         Phone

 

                NAT BELTRAN     FRIEND          [address]  [phone]



                                                [address]          



                                                [place], [place] [address] 

 

                GARO WYMAN    FRIEND          [address] [phone]



                                                [address]          



                                                [place] [address] 

 

                UE              Unavailable     Unavailable     Unavailable

 

                RANDELL MCPHERSON    FRIEND          [address] [phone]



                                                [place] [address] 

 

                GARO WYMAN    Other           [address] [phone]

[phone]



                                                [place] [address] 









Re-disclosure Warning

The records that you are about to access may contain information from federally-
assisted alcohol or drug abuse programs. If such information is present, then 
the following federally mandated warning applies: This information has been 
disclosed to you from records protected by federal confidentiality rules (42 CFR
part 2). The federal rules prohibit you from making any further disclosure of 
this information unless further disclosure is expressly permitted by the written
consent of the person to whom it pertains or as otherwise permitted by 42 CFR 
part 2. A general authorization for the release of medical or other information 
is NOT sufficient for this purpose. The Federal rules restrict any use of the 
information to criminally investigate or prosecute any alcohol or drug abuse 
patient.The records that you are about to access may contain highly sensitive 
health information, the redisclosure of which is protected by Article 27-F of 
the [facility] Public Health law. If you continue you may haveaccess to 
information: Regarding HIV / AIDS; Provided by facilities licensed or operated 
by the [facility] Office of Mental Health; or Provided by the [facility]
Office for People With Developmental Disabilities. If such information is 
present, then the following [place] mandated warning applies: This 
information has been disclosed to you from confidential records which are 
protected by state law. State law prohibits you from making any further 
disclosure of this information without the specific written consent of the 
person to whom it pertains, or as otherwise permitted by law. Any unauthorized 
further disclosure in violation of state law may result in a fine or halfway 
sentence or both. A general authorization for the release of medical or other 
information is NOT sufficient authorization for further disclosure.



Insurance Providers







          Payer name Policy type Policy ID Covered   Covered party's Policy    P

rebecca



                    / Coverage           party ID  relationship to [place]    Inf

ormation



                    type                          [place]              

 

          UN                [id number]           SP                  [id number]



          MEDICAID                                                    



          COMM PLAN                                                   

 

          UN                [id number]           SP                  [id number]



          MEDICAID                                                    



          COMM PLAN                                                   

 

          MEDICAID            [id number]            SP                  [id number]







Results







                    ID                  Date                Data Source

 

                    [id number]         2020 12:00:00 AM EDT [facility]











          Name      Value     Range     Interpretation Description Data      Sup

porting



                                        Code                Source(s) Document(s

)

 

          SARS                                              [facility]   



          coronavirus 2                                                   



          RNA                                                         









                                        This lab was ordered by [facility] Sam Carroll and reported by [facility].











                    ID                  Date                Data Source

 

                    [id number]        2020 12:00:00 AM EDT [facility]











          Name      Value     Range     Interpretation Code Description Data Johana

rce(s) Supporting



                                                                      Document(s

)

 

          SARS-CoV-2                                         [facility]    



          RNA Resp                                                    



          Ql                                                          



          PEPE+probe                                                   









                                        This lab was ordered by [facility]

ITAL and reported by [facility] 

[facility]



                                        Lab.











                    ID                  Date                Data Source

 

                    [id number]  2020 11:42:00 AM EDT [facility]











          Name      Value     Range     Interpretation Code Description Data Johana

rce(s) Supporting



                                                                      Document(s

)

 

          SARS-CoV-2                                         [facility]    



          RNA Resp                                                    



          Ql                                                          



          PEPE+probe                                                   









                                        This lab was ordered by [facility] a

nd reported by [facility].









                                        Procedure

## 2020-10-16 NOTE — XMS
Summarization Of Episode

                           Created on:2020



Patient:IMTIAZ MATUTE

Sex:Male

:1985

External Reference #:[id number]





Demographics







                          Address                   [address]



                                                    [place] [id number]

 

                          Home Phone                [phone]

 

                          Email Address             N

 

                          Preferred Language        English

 

                          Marital Status            Not  or 

 

                          Congregation Affiliation     Voodoo

 

                          Race                      WH

 

                          Ethnic Group              Not  or 









Author







                          Organization              [facility]









Support







                Name            Relationship    Address         Phone

 

                NAT BELTRAN     FRIEND          [address]  [phone]



                                                [address]          



                                                [place] 

 

                GARO WYMAN    FRIEND          [address] [phone]



                                                [address]          



                                                [place] [address] 

 

                UE              Unavailable     Unavailable     Unavailable

 

                RANDELL MCPHERSON    FRIEND          [address] [phone]



                                                [place] [address] 

 

                GARO WYMAN    Other           [address] [phone]

[phone]



                                                [place] [address] 









Re-disclosure Warning

The records that you are about to access may contain information from federally-
assisted alcohol or drug abuse programs. If such information is present, then 
the following federally mandated warning applies: This information has been 
disclosed to you from records protected by federal confidentiality rules (42 CFR
part 2). The federal rules prohibit you from making any further disclosure of 
this information unless further disclosure is expressly permitted by the written
consent of the person to whom it pertains or as otherwise permitted by 42 CFR 
part 2. A general authorization for the release of medical or other information 
is NOT sufficient for this purpose. The Federal rules restrict any use of the 
information to criminally investigate or prosecute any alcohol or drug abuse 
patient.The records that you are about to access may contain highly sensitive 
health information, the redisclosure of which is protected by Article 27-F of 
the [facility] Public Health law. If you continue you may haveaccess to 
information: Regarding HIV / AIDS; Provided by facilities licensed or operated 
by the [facility] Office of Mental Health; or Provided by the [facility]
Office for People With Developmental Disabilities. If such information is 
present, then the following [place] mandated warning applies: This 
information has been disclosed to you from confidential records which are 
protected by state law. State law prohibits you from making any further 
disclosure of this information without the specific written consent of the 
person to whom it pertains, or as otherwise permitted by law. Any unauthorized 
further disclosure in violation of state law may result in a fine or skilled nursing 
sentence or both. A general authorization for the release of medical or other 
information is NOT sufficient authorization for further disclosure.



Insurance Providers







          Payer name Policy type Policy ID Covered   Covered party's Policy    P

rebecca



                    / Coverage           party ID  relationship to [place]    Inf

ormation



                    type                          [place]              

 

          UN                [id number]           SP                  [id number]



          MEDICAID                                                    



          COMM PLAN                                                   

 

          UN                [id number]           SP                  [id number]



          MEDICAID                                                    



          COMM PLAN                                                   

 

          MEDICAID            [id number]            SP                  [id number]







Results







                    ID                  Date                Data Source

 

                    [id number]         2020 12:00:00 AM EDT [facility]











          Name      Value     Range     Interpretation Description Data      Sup

porting



                                        Code                Source(s) Document(s

)

 

          SARS                                              [facility]   



          coronavirus 2                                                   



          RNA                                                         









                                        This lab was ordered by [facility] Sam Carroll and reported by [facility].











                    ID                  Date                Data Source

 

                    [id number]        2020 12:00:00 AM EDT [facility]











          Name      Value     Range     Interpretation Code Description Data Johana

rce(s) Supporting



                                                                      Document(s

)

 

          SARS-CoV-2                                         [facility]    



          RNA Resp                                                    



          Ql                                                          



          PEPE+probe                                                   









                                        This lab was ordered by [facility]

ITAL and reported by [facility] 

[facility]



                                        Lab.











                    ID                  Date                Data Source

 

                    [id number]  2020 11:42:00 AM EDT [facility]











          Name      Value     Range     Interpretation Code Description Data Johana

rce(s) Supporting



                                                                      Document(s

)

 

          SARS-CoV-2                                         [facility]    



          RNA Resp                                                    



          Ql                                                          



          PEPE+probe                                                   









                                        This lab was ordered by [facility] a

nd reported by [facility].









                                        Procedure

## 2020-10-17 NOTE — PN
[facility] COWS





- Scale


Resting Pulse: 0= DE 80 or Below


Sweatin= Chills/Flushing


Restless Observation: 0= Sits Still


Pupil Size: 0= Normal to Room Light


Bone or Joint Aches: 2= Severe Diffuse Aches


Runny Nose/ Eye Tearin= Nasal Congestion


GI Upset > 30mins: 0= None


Tremor Observation of Outstretched Hands: 0= None


Yawning Observation: 1= 1-2x During Session


Anxiety or Irritability: 2=Irritable/Anxious


Goose Flesh Skin: 3=Piloerection


COWS Score: 10





[facility] Progress Note (SOAP)


Subjective: 





Anxious, Sweating, Body Aches, Nasal Congestion.


Objective: 


Patient A & O X 3, Observed Ambulating on Detox Unit Unassisted. In No Acute 

Distress.





10/17/20 17:26





                                   Vital Signs











Temperature  96.8 F L  10/17/20 16:45


 


Pulse Rate  64   10/17/20 16:45


 


Respiratory Rate  17   10/17/20 16:45


 


Blood Pressure  116/69   10/17/20 16:45


 


O2 Sat by Pulse Oximetry (%)  98   10/17/20 16:45











                                Laboratory Tests











  10/17/20 10/17/20 10/17/20





  07:05 07:05 07:05


 


WBC  7.0  


 


RBC  4.50  


 


Hgb  14.0  


 


Hct  39.8  D  


 


MCV  88.5  


 


MCH  31.2  


 


MCHC  35.3  


 


RDW  12.9  


 


Plt Count  163  D  


 


MPV  8.1  


 


Sodium   142 


 


Potassium   4.2 


 


Chloride   109 H 


 


Carbon Dioxide   30 


 


Anion Gap   3 L 


 


BUN   14.0 


 


Creatinine   0.8 


 


Est GFR (CKD-EPI)AfAm   135.08 


 


Est GFR (CKD-EPI)NonAf   116.55 


 


Random Glucose   69 L 


 


Calcium   8.7 


 


Total Bilirubin   0.3 


 


AST   12 L 


 


ALT   14 


 


Alkaline Phosphatase   53 


 


Total Protein   6.2 L 


 


Albumin   3.4 


 


Syphilis Serology    Non-reactive














Lab Results noted.


Assessment: 





10/17/20 17:27


WITHDRAWAL SYMPTOMS.


Plan: 





Continue Detox.


Increase Daily Oral Water Intake.

## 2020-10-17 NOTE — CONSULT
[facility] Psychiatric Consult





- Data


Date of interview: 10/17/20


Admission source: [facility]


Identifying data: Patient is a 35 year old single male, without children, 

unemployed, and currently homeless. This is one of multiple admissions for 

patient. Patient admitted to  for opiate and cocaine dependence.


Substance Abuse History: Smoking Cessation.  Smoking history: Current every day 

smoker.  Have you smoked in the past 12 months: Yes.  Aproximately how many 

cigarettes per day: 40.  Cigars Per Day: 0.  Hx Chewing Tobacco Use: No.  

Initiated information on smoking cessation: Yes.  'Breaking Loose' booklet 

given: 10/16/20.  - Substance & Tx. History.  Hx Alcohol Use: No.  Hx Substance 

Use: Yes.  Substance Use Type: Cocaine, Heroin, Opiates, Tranquilizers 

(Benzo's).  Hx Substance Use Treatment: Yes (detox, rehab, past MMTP)


Medical History: denies.


Psychiatric History: Patient denies history of psychiatric hospitalization, 

outpatient psychiatric care, and suicide attempt. States that he has received 

trazodone 200mg or seroquel 50/100mg for insomnia when admitted to detox/ rehab 

facilities. At present patient reports poor sleep and is requesting seroquel for

insomnia.


Physical/Sexual Abuse/Trauma History: denies.





Mental Status Exam





- Mental Status Exam


Alert and Oriented to: Time, Place, Person


Cognitive Function: Good


Patient Appearance: Well Groomed


Mood: Withdrawn


Affect: Mood Congruent


Patient Behavior: Cooperative


Speech Pattern: Appropriate


Voice Loudness: Normal


Thought Process: Intact, Goal Oriented


Thought Disorder: Not Present


Hallucinations: Denies


Suicidal Ideation: Denies


Homicidal Ideation: Denies


Insight/Judgement: Poor


Sleep: Poorly


Appetite: Fair


Muscle strength/Tone: Normal


Gait/Station: Normal





Psychiatric Findings





- Problem List (Axis 1, 2,3)


(1) Opioid dependence with withdrawal


Current Visit: Yes   Status: Acute   





(2) Sedative, hypnotic or anxiolytic use disorder, moderate, in early remission,

dependence


Current Visit: Yes   Status: Acute   





(3) Cocaine dependence, uncomplicated


Current Visit: Yes   Status: Chronic   





(4) Nicotine dependence


Current Visit: Yes   Status: Chronic   


Qualifiers: 


   Nicotine product type: cigarettes   Substance use status: uncomplicated   

Qualified Code(s): F17.210 - Nicotine dependence, cigarettes, uncomplicated   





(5) Substance-induced sleep disorder


Current Visit: Yes   Status: Acute   





- Initial Treatment Plan


Initial Treatment Plan: Psychoeducation provided. Detoxification in progress. 

Will order Seroquel 50mg HS. Benefits and side effects discussed. Verbal consent

given.

## 2020-10-18 NOTE — PN
[facility] COWS





- Scale


Resting Pulse: 0= DE 80 or Below


Sweatin= Chills/Flushing


Restless Observation: 0= Sits Still


Pupil Size: 0= Normal to Room Light


Bone or Joint Aches: 2= Severe Diffuse Aches


Runny Nose/ Eye Tearin= Nasal Congestion


GI Upset > 30mins: 0= None


Tremor Observation of Outstretched Hands: 2= Slight Tremor Visible


Yawning Observation: 0= None


Anxiety or Irritability: 2=Irritable/Anxious


Goose Flesh Skin: 0=Smooth Skin


COWS Score: 8





[facility] Progress Note (SOAP)


Subjective: 





Complaints of nasal congestion, tremors, anxiety, and bone aches.


Objective: 





10/18/20 11:39


                                   Vital Signs











  10/18/20 10/18/20





  06:13 08:50


 


Temperature 97.1 F L 97.8 F


 


Pulse Rate 59 L 88


 


Respiratory 20 18





Rate  


 


Blood Pressure 93/57 L 114/67


 


O2 Sat by Pulse 100 100





Oximetry (%)  








                             Laboratory Last Values











WBC  7.0 K/mm3 (4.0-10.0)   10/17/20  07:05    


 


RBC  4.50 M/mm3 (4.00-5.60)   10/17/20  07:05    


 


Hgb  14.0 GM/dL (11.7-16.9)   10/17/20  07:05    


 


Hct  39.8 % (35.4-49)  D 10/17/20  07:05    


 


MCV  88.5 fl (80-96)   10/17/20  07:05    


 


MCH  31.2 pg (25.7-33.7)   10/17/20  07:05    


 


MCHC  35.3 g/dl (32.0-35.9)   10/17/20  07:05    


 


RDW  12.9 % (11.9-15.9)   10/17/20  07:05    


 


Plt Count  163 K/MM3 (134-434)  D 10/17/20  07:05    


 


MPV  8.1 fl (7.5-11.1)   10/17/20  07:05    


 


Sodium  142 mmol/L (136-145)   10/17/20  07:05    


 


Potassium  4.2 mmol/L (3.5-5.1)   10/17/20  07:05    


 


Chloride  109 mmol/L ()  H  10/17/20  07:05    


 


Carbon Dioxide  30 mmol/L (21-32)   10/17/20  07:05    


 


Anion Gap  3 MMOL/L (8-16)  L  10/17/20  07:05    


 


BUN  14.0 mg/dL (7-18)   10/17/20  07:05    


 


Creatinine  0.8 mg/dL (0.55-1.3)   10/17/20  07:05    


 


Est GFR (CKD-EPI)AfAm  135.08   10/17/20  07:05    


 


Est GFR (CKD-EPI)NonAf  116.55   10/17/20  07:05    


 


Random Glucose  69 mg/dL ()  L  10/17/20  07:05    


 


Calcium  8.7 mg/dL (8.5-10.1)   10/17/20  07:05    


 


Total Bilirubin  0.3 mg/dL (0.2-1)   10/17/20  07:05    


 


AST  12 U/L (15-37)  L  10/17/20  07:05    


 


ALT  14 U/L (13-61)   10/17/20  07:05    


 


Alkaline Phosphatase  53 U/L ()   10/17/20  07:05    


 


Total Protein  6.2 g/dl (6.4-8.2)  L  10/17/20  07:05    


 


Albumin  3.4 g/dl (3.4-5.0)   10/17/20  07:05    


 


Syphilis Serology  Non-reactive  (NONREACTIVE)   10/17/20  07:05    


 


COVID-19 (PEPE)  Not detected  (Not Detected)   10/16/20  18:20    








Labs noted.


Assessment: 





10/18/20 11:39


Alert and oriented x3, in no acute respiratory distress.


Full ROM, ambulating in unit without any assistance.


Skin warm to touch without lesions.


Withdrawal symptoms.


Plan: 





Continue detox protocol.

## 2020-10-19 NOTE — PN
[facility] COWS





- Scale


Resting Pulse: 0= [place] 80 or Below


Sweatin= No chills or Flushing


Restless Observation: 0= Sits Still


Pupil Size: 1= Pupils >than Normal


Bone or Joint Aches: 1= Mild Discomfort


Runny Nose/ Eye Tearin= Nasal Congestion


GI Upset > 30mins: 1= Stomach Cramp


Tremor Observation of Outstretched Hands: 2= Slight Tremor Visible


Yawning Observation: 1= 1-2x During Session


Anxiety or Irritability: 2=Irritable/Anxious


Goose Flesh Skin: 0=Smooth Skin


COWS Score: 9





[facility] Progress Note (SOAP)


Subjective: 





alert,irritable,anxious,interrupted sleep,tremor,pain in the body and back


Objective: 





10/19/20 10:09


                                   Vital Signs











Temperature  97.7 F   10/19/20 08:30


 


Pulse Rate  72   10/19/20 08:30


 


Respiratory Rate  18   10/19/20 08:30


 


Blood Pressure  105/57 L  10/19/20 08:30


 


O2 Sat by Pulse Oximetry (%)  98   10/19/20 08:30











10/19/20 10:09


                             Laboratory Last Values











WBC  7.0 K/mm3 (4.0-10.0)   10/17/20  07:05    


 


RBC  4.50 M/mm3 (4.00-5.60)   10/17/20  07:05    


 


Hgb  14.0 GM/dL (11.7-16.9)   10/17/20  07:05    


 


Hct  39.8 % (35.4-49)  D 10/17/20  07:05    


 


MCV  88.5 fl (80-96)   10/17/20  07:05    


 


MCH  31.2 pg (25.7-33.7)   10/17/20  07:05    


 


MCHC  35.3 g/dl (32.0-35.9)   10/17/20  07:05    


 


RDW  12.9 % (11.9-15.9)   10/17/20  07:05    


 


Plt Count  163 K/MM3 (134-434)  D 10/17/20  07:05    


 


MPV  8.1 fl (7.5-11.1)   10/17/20  07:05    


 


Sodium  142 mmol/L (136-145)   10/17/20  07:05    


 


Potassium  4.2 mmol/L (3.5-5.1)   10/17/20  07:05    


 


Chloride  109 mmol/L ()  H  10/17/20  07:05    


 


Carbon Dioxide  30 mmol/L (21-32)   10/17/20  07:05    


 


Anion Gap  3 MMOL/L (8-16)  L  10/17/20  07:05    


 


BUN  14.0 mg/dL (7-18)   10/17/20  07:05    


 


Creatinine  0.8 mg/dL (0.55-1.3)   10/17/20  07:05    


 


Est GFR (CKD-EPI)AfAm  135.08   10/17/20  07:05    


 


Est GFR (CKD-EPI)NonAf  116.55   10/17/20  07:05    


 


Random Glucose  69 mg/dL ()  L  10/17/20  07:05    


 


Calcium  8.7 mg/dL (8.5-10.1)   10/17/20  07:05    


 


Total Bilirubin  0.3 mg/dL (0.2-1)   10/17/20  07:05    


 


AST  12 U/L (15-37)  L  10/17/20  07:05    


 


ALT  14 U/L (13-61)   10/17/20  07:05    


 


Alkaline Phosphatase  53 U/L ()   10/17/20  07:05    


 


Total Protein  6.2 g/dl (6.4-8.2)  L  10/17/20  07:05    


 


Albumin  3.4 g/dl (3.4-5.0)   10/17/20  07:05    


 


Syphilis Serology  Non-reactive  (NONREACTIVE)   10/17/20  07:05    


 


COVID-19 (PEPE)  Not detected  (Not Detected)   10/16/20  18:20    











Assessment: 





10/19/20 10:09


withdrawal symptom


Plan: 





continue detox methadone regimen

## 2020-10-19 NOTE — PN
[facility] Progress Note


Note: 


Patient states he has to leave. States needed o make an important phone call but

when states would help to make call but declined. 





Alert and oriented.


Gait steady. Faint tremor of hands.


Lungs CTA.


Abd S/NT/BS+


                               Vital Signs - 24 hr











  10/18/20 10/19/20 10/19/20





  20:25 05:23 08:30


 


Temperature 98.0 F 98.0 F 97.7 F


 


Pulse Rate 80 59 L 72


 


Respiratory 18 16 18





Rate   


 


Blood Pressure 106/63 96/65 105/57 L


 


O2 Sat by Pulse 99 94 L 98





Oximetry (%)   














  10/19/20





  12:48


 


Temperature 97.1 F L


 


Pulse Rate 93 H


 


Respiratory 18





Rate 


 


Blood Pressure 105/64


 


O2 Sat by Pulse 98





Oximetry (%) 








                             Laboratory Last Values











WBC  7.0 K/mm3 (4.0-10.0)   10/17/20  07:05    


 


RBC  4.50 M/mm3 (4.00-5.60)   10/17/20  07:05    


 


Hgb  14.0 GM/dL (11.7-16.9)   10/17/20  07:05    


 


Hct  39.8 % (35.4-49)  D 10/17/20  07:05    


 


MCV  88.5 fl (80-96)   10/17/20  07:05    


 


MCH  31.2 pg (25.7-33.7)   10/17/20  07:05    


 


MCHC  35.3 g/dl (32.0-35.9)   10/17/20  07:05    


 


RDW  12.9 % (11.9-15.9)   10/17/20  07:05    


 


Plt Count  163 K/MM3 (134-434)  D 10/17/20  07:05    


 


MPV  8.1 fl (7.5-11.1)   10/17/20  07:05    


 


Sodium  142 mmol/L (136-145)   10/17/20  07:05    


 


Potassium  4.2 mmol/L (3.5-5.1)   10/17/20  07:05    


 


Chloride  109 mmol/L ()  H  10/17/20  07:05    


 


Carbon Dioxide  30 mmol/L (21-32)   10/17/20  07:05    


 


Anion Gap  3 MMOL/L (8-16)  L  10/17/20  07:05    


 


BUN  14.0 mg/dL (7-18)   10/17/20  07:05    


 


Creatinine  0.8 mg/dL (0.55-1.3)   10/17/20  07:05    


 


Est GFR (CKD-EPI)AfAm  135.08   10/17/20  07:05    


 


Est GFR (CKD-EPI)NonAf  116.55   10/17/20  07:05    


 


Random Glucose  69 mg/dL ()  L  10/17/20  07:05    


 


Calcium  8.7 mg/dL (8.5-10.1)   10/17/20  07:05    


 


Total Bilirubin  0.3 mg/dL (0.2-1)   10/17/20  07:05    


 


AST  12 U/L (15-37)  L  10/17/20  07:05    


 


ALT  14 U/L (13-61)   10/17/20  07:05    


 


Alkaline Phosphatase  53 U/L ()   10/17/20  07:05    


 


Total Protein  6.2 g/dl (6.4-8.2)  L  10/17/20  07:05    


 


Albumin  3.4 g/dl (3.4-5.0)   10/17/20  07:05    


 


Syphilis Serology  Non-reactive  (NONREACTIVE)   10/17/20  07:05    


 


COVID-19 (PEPE)  Not detected  (Not Detected)   10/16/20  18:20    








Reviewed labs w/ patient.


Attempted to discuss the effects of methadone withdrawal on motivation and the 

desire to leave but refused to engage. 


Opioid overdose risks and prevention discussed, as uses Xanax, and states will 

be fine. 


Declined Narcan. 


Patient declined discussion r/t joining a MMTP


Declined nicotine patch and gum.


Patient left as an early discharge.

## 2020-10-19 NOTE — DS
[facility] Detox Discharge Summary


Admission Date: 


10/16/20





Discharge Date: 10/19/20





- History


Present History: Cocaine Dependence, Opioid Dependence, Sedative Dependence


Additional Comments: 


Heroin use since 27. Recent use abou2 15 bags daily - nasal.


Methadone -states purchases from streets. (50-100mg PO doses)


Xanax - 2-4 mg PO daily


Pertinent Past History: 


 Cocaine use since age 27. $60/day. 


Nicotine use since age 15. Smokes 2 PPD.


PMHx: Denies significant PMH; 


Hx TB immunization as a child. + PPD.   


MHHx: Depression - r/t withdrawal symptoms. Denies thoughts of harming self or 

others.


SHx: Domiciled. Unemployed. Denies legal. 





- Physical Exam Results


Vital Signs: 


                                   Vital Signs











Temperature  97.1 F L  10/19/20 12:48


 


Pulse Rate  93 H  10/19/20 12:48


 


Respiratory Rate  18   10/19/20 12:48


 


Blood Pressure  105/64   10/19/20 12:48


 


O2 Sat by Pulse Oximetry (%)  98   10/19/20 12:48











Pertinent Admission Physical Exam Findings: 


 Admitted for opioid detox and tolerated methadone. Was also receiving prn 

Valium for possible co-occurring anxiolytic withdrawal with decrease in acute 

withdrawal symptoms.





Alert and oriented. Gait steady.


Mild tremor felt in hands. 


Lungs CTA.


Abd S/NT/BS+


                             Laboratory Last Values











WBC  7.0 K/mm3 (4.0-10.0)   10/17/20  07:05    


 


RBC  4.50 M/mm3 (4.00-5.60)   10/17/20  07:05    


 


Hgb  14.0 GM/dL (11.7-16.9)   10/17/20  07:05    


 


Hct  39.8 % (35.4-49)  D 10/17/20  07:05    


 


MCV  88.5 fl (80-96)   10/17/20  07:05    


 


MCH  31.2 pg (25.7-33.7)   10/17/20  07:05    


 


MCHC  35.3 g/dl (32.0-35.9)   10/17/20  07:05    


 


RDW  12.9 % (11.9-15.9)   10/17/20  07:05    


 


Plt Count  163 K/MM3 (134-434)  D 10/17/20  07:05    


 


MPV  8.1 fl (7.5-11.1)   10/17/20  07:05    


 


Sodium  142 mmol/L (136-145)   10/17/20  07:05    


 


Potassium  4.2 mmol/L (3.5-5.1)   10/17/20  07:05    


 


Chloride  109 mmol/L ()  H  10/17/20  07:05    


 


Carbon Dioxide  30 mmol/L (21-32)   10/17/20  07:05    


 


Anion Gap  3 MMOL/L (8-16)  L  10/17/20  07:05    


 


BUN  14.0 mg/dL (7-18)   10/17/20  07:05    


 


Creatinine  0.8 mg/dL (0.55-1.3)   10/17/20  07:05    


 


Est GFR (CKD-EPI)AfAm  135.08   10/17/20  07:05    


 


Est GFR (CKD-EPI)NonAf  116.55   10/17/20  07:05    


 


Random Glucose  69 mg/dL ()  L  10/17/20  07:05    


 


Calcium  8.7 mg/dL (8.5-10.1)   10/17/20  07:05    


 


Total Bilirubin  0.3 mg/dL (0.2-1)   10/17/20  07:05    


 


AST  12 U/L (15-37)  L  10/17/20  07:05    


 


ALT  14 U/L (13-61)   10/17/20  07:05    


 


Alkaline Phosphatase  53 U/L ()   10/17/20  07:05    


 


Total Protein  6.2 g/dl (6.4-8.2)  L  10/17/20  07:05    


 


Albumin  3.4 g/dl (3.4-5.0)   10/17/20  07:05    


 


Syphilis Serology  Non-reactive  (NONREACTIVE)   10/17/20  07:05    


 


COVID-19 (PEPE)  Not detected  (Not Detected)   10/16/20  18:20    








Reviewed labs w/ patient.


Attempted to discuss the effects of methadone withdrawal on motivation and the 

desire to leave but refused to engage. 


Opioid overdose risks and prevention discussed, as also uses Xanax. States will 

be fine. 


Declined Narcan. 


Patient declined discussion r/t joining a MMTP


Declined nicotine patch and gum.


Patient left as an early discharge. 


 














- Treatment


Hospital Course: Detox Protocol Followed (Left early.), Discharged Condition 

Good (Alert and oriented w/ steady gait.)





- Medication


Discharge Medications: 


Ambulatory Orders





NK [No Known Home Medication]  10/16/20 











- Diagnosis


(1) Opioid dependence with withdrawal


Status: Acute   





(2) Sedative, hypnotic or anxiolytic use disorder, moderate, in early remission,

dependence


Status: Acute   





(3) Cocaine dependence, uncomplicated


Status: Chronic   





(4) History of positive PPD


Status: Chronic   





(5) Nicotine dependence


Status: Chronic   


Qualifiers: 


   Nicotine product type: cigarettes   Substance use status: uncomplicated   

Qualified Code(s): F17.210 - Nicotine dependence, cigarettes, uncomplicated   





- AMA


Did Patient Leave Against Medical Advice: No (Patient left early. )

## 2021-10-05 ENCOUNTER — HOSPITAL ENCOUNTER (INPATIENT)
Dept: HOSPITAL 74 - YASAS | Age: 36
LOS: 4 days | Discharge: HOME | DRG: 773 | End: 2021-10-09
Attending: ALLERGY & IMMUNOLOGY | Admitting: ALLERGY & IMMUNOLOGY
Payer: COMMERCIAL

## 2021-10-05 VITALS — BODY MASS INDEX: 21.1 KG/M2

## 2021-10-05 DIAGNOSIS — F11.23: Primary | ICD-10-CM

## 2021-10-05 DIAGNOSIS — F10.230: ICD-10-CM

## 2021-10-05 DIAGNOSIS — R76.11: ICD-10-CM

## 2021-10-05 DIAGNOSIS — F13.20: ICD-10-CM

## 2021-10-05 DIAGNOSIS — F17.210: ICD-10-CM

## 2021-10-05 DIAGNOSIS — F32.9: ICD-10-CM

## 2021-10-05 PROCEDURE — U0003 INFECTIOUS AGENT DETECTION BY NUCLEIC ACID (DNA OR RNA); SEVERE ACUTE RESPIRATORY SYNDROME CORONAVIRUS 2 (SARS-COV-2) (CORONAVIRUS DISEASE [COVID-19]), AMPLIFIED PROBE TECHNIQUE, MAKING USE OF HIGH THROUGHPUT TECHNOLOGIES AS DESCRIBED BY CMS-2020-01-R: HCPCS

## 2021-10-05 PROCEDURE — HZ2ZZZZ DETOXIFICATION SERVICES FOR SUBSTANCE ABUSE TREATMENT: ICD-10-PCS | Performed by: ALLERGY & IMMUNOLOGY

## 2021-10-05 PROCEDURE — C9803 HOPD COVID-19 SPEC COLLECT: HCPCS

## 2021-10-05 PROCEDURE — U0005 INFEC AGEN DETEC AMPLI PROBE: HCPCS

## 2021-10-05 RX ADMIN — Medication SCH MG: at 21:45

## 2021-10-05 RX ADMIN — Medication SCH: at 21:54

## 2021-10-05 RX ADMIN — Medication SCH TAB: at 21:44

## 2021-10-05 RX ADMIN — HYDROXYZINE PAMOATE SCH: 25 CAPSULE ORAL at 21:57

## 2021-10-05 RX ADMIN — HYDROXYZINE PAMOATE SCH: 25 CAPSULE ORAL at 21:54

## 2021-10-05 RX ADMIN — HYDROXYZINE PAMOATE SCH MG: 25 CAPSULE ORAL at 21:44

## 2021-10-05 RX ADMIN — Medication SCH: at 21:55

## 2021-10-05 RX ADMIN — NICOTINE POLACRILEX PRN MG: 2 GUM, CHEWING ORAL at 21:56

## 2021-10-06 LAB
ALBUMIN SERPL-MCNC: 3.5 G/DL (ref 3.4–5)
ALP SERPL-CCNC: 55 U/L (ref 45–117)
ALT SERPL-CCNC: 24 U/L (ref 13–61)
ANION GAP SERPL CALC-SCNC: 4 MMOL/L (ref 8–16)
AST SERPL-CCNC: 22 U/L (ref 15–37)
BILIRUB SERPL-MCNC: 0.4 MG/DL (ref 0.2–1)
BUN SERPL-MCNC: 13.7 MG/DL (ref 7–18)
CALCIUM SERPL-MCNC: 8.9 MG/DL (ref 8.5–10.1)
CHLORIDE SERPL-SCNC: 108 MMOL/L (ref 98–107)
CO2 SERPL-SCNC: 28 MMOL/L (ref 21–32)
CREAT SERPL-MCNC: 0.8 MG/DL (ref 0.55–1.3)
DEPRECATED RDW RBC AUTO: 12.8 % (ref 11.9–15.9)
GLUCOSE SERPL-MCNC: 138 MG/DL (ref 74–106)
HCT VFR BLD CALC: 43.8 % (ref 35.4–49)
HGB BLD-MCNC: 15.3 GM/DL (ref 11.7–16.9)
MCH RBC QN AUTO: 30.9 PG (ref 25.7–33.7)
MCHC RBC AUTO-ENTMCNC: 34.8 G/DL (ref 32–35.9)
MCV RBC: 88.8 FL (ref 80–96)
PLATELET # BLD AUTO: 226 10^3/UL (ref 134–434)
PMV BLD: 7.6 FL (ref 7.5–11.1)
PROT SERPL-MCNC: 6.9 G/DL (ref 6.4–8.2)
RBC # BLD AUTO: 4.94 M/MM3 (ref 4–5.6)
SODIUM SERPL-SCNC: 140 MMOL/L (ref 136–145)
WBC # BLD AUTO: 6.2 K/MM3 (ref 4–10)

## 2021-10-06 RX ADMIN — Medication SCH: at 22:11

## 2021-10-06 RX ADMIN — NICOTINE POLACRILEX PRN MG: 2 GUM, CHEWING ORAL at 02:24

## 2021-10-06 RX ADMIN — HYDROXYZINE PAMOATE SCH: 25 CAPSULE ORAL at 11:29

## 2021-10-06 RX ADMIN — HYDROXYZINE PAMOATE SCH: 25 CAPSULE ORAL at 14:15

## 2021-10-06 RX ADMIN — NICOTINE POLACRILEX PRN MG: 2 GUM, CHEWING ORAL at 22:07

## 2021-10-06 RX ADMIN — HYDROXYZINE PAMOATE SCH: 25 CAPSULE ORAL at 17:51

## 2021-10-06 RX ADMIN — Medication SCH TAB: at 11:29

## 2021-10-06 RX ADMIN — HYDROXYZINE PAMOATE SCH: 25 CAPSULE ORAL at 22:11

## 2021-10-06 RX ADMIN — NICOTINE POLACRILEX PRN MG: 2 GUM, CHEWING ORAL at 11:28

## 2021-10-06 RX ADMIN — HYDROXYZINE PAMOATE SCH: 25 CAPSULE ORAL at 07:18

## 2021-10-07 RX ADMIN — HYDROXYZINE PAMOATE SCH: 25 CAPSULE ORAL at 10:40

## 2021-10-07 RX ADMIN — Medication SCH: at 23:06

## 2021-10-07 RX ADMIN — HYDROXYZINE PAMOATE SCH: 25 CAPSULE ORAL at 05:37

## 2021-10-07 RX ADMIN — Medication SCH: at 10:40

## 2021-10-07 RX ADMIN — NICOTINE POLACRILEX PRN MG: 2 GUM, CHEWING ORAL at 10:23

## 2021-10-07 RX ADMIN — HYDROXYZINE PAMOATE SCH: 25 CAPSULE ORAL at 14:04

## 2021-10-08 RX ADMIN — Medication SCH: at 10:18

## 2021-10-08 RX ADMIN — Medication SCH MG: at 22:41

## 2021-10-08 RX ADMIN — NICOTINE POLACRILEX PRN MG: 2 GUM, CHEWING ORAL at 01:56

## 2021-10-08 RX ADMIN — NICOTINE POLACRILEX PRN MG: 2 GUM, CHEWING ORAL at 10:19

## 2021-10-08 RX ADMIN — Medication SCH: at 22:44

## 2021-10-08 RX ADMIN — NICOTINE PRN MG: 4 INHALANT RESPIRATORY (INHALATION) at 22:40

## 2021-10-09 VITALS — HEART RATE: 80 BPM | TEMPERATURE: 97.7 F | DIASTOLIC BLOOD PRESSURE: 67 MMHG | SYSTOLIC BLOOD PRESSURE: 107 MMHG

## 2021-10-09 RX ADMIN — Medication SCH TAB: at 11:11

## 2021-10-09 RX ADMIN — NICOTINE PRN MG: 4 INHALANT RESPIRATORY (INHALATION) at 11:25

## 2022-11-01 ENCOUNTER — HOSPITAL ENCOUNTER (INPATIENT)
Dept: HOSPITAL 74 - YASAS | Age: 37
LOS: 2 days | Discharge: LEFT BEFORE DISCHARGE | DRG: 770 | End: 2022-11-03
Attending: SURGERY | Admitting: ALLERGY & IMMUNOLOGY
Payer: COMMERCIAL

## 2022-11-01 VITALS — BODY MASS INDEX: 21.7 KG/M2

## 2022-11-01 DIAGNOSIS — L03.113: ICD-10-CM

## 2022-11-01 DIAGNOSIS — F13.20: ICD-10-CM

## 2022-11-01 DIAGNOSIS — F17.210: ICD-10-CM

## 2022-11-01 DIAGNOSIS — F14.20: ICD-10-CM

## 2022-11-01 DIAGNOSIS — G89.29: ICD-10-CM

## 2022-11-01 DIAGNOSIS — M54.50: ICD-10-CM

## 2022-11-01 DIAGNOSIS — R76.11: ICD-10-CM

## 2022-11-01 DIAGNOSIS — F11.23: Primary | ICD-10-CM

## 2022-11-01 DIAGNOSIS — L03.114: ICD-10-CM

## 2022-11-01 PROCEDURE — U0003 INFECTIOUS AGENT DETECTION BY NUCLEIC ACID (DNA OR RNA); SEVERE ACUTE RESPIRATORY SYNDROME CORONAVIRUS 2 (SARS-COV-2) (CORONAVIRUS DISEASE [COVID-19]), AMPLIFIED PROBE TECHNIQUE, MAKING USE OF HIGH THROUGHPUT TECHNOLOGIES AS DESCRIBED BY CMS-2020-01-R: HCPCS

## 2022-11-01 PROCEDURE — U0005 INFEC AGEN DETEC AMPLI PROBE: HCPCS

## 2022-11-01 PROCEDURE — HZ2ZZZZ DETOXIFICATION SERVICES FOR SUBSTANCE ABUSE TREATMENT: ICD-10-PCS | Performed by: SURGERY

## 2022-11-01 RX ADMIN — Medication SCH: at 23:10

## 2022-11-01 RX ADMIN — NICOTINE POLACRILEX PRN MG: 2 GUM, CHEWING ORAL at 17:46

## 2022-11-01 RX ADMIN — METHOCARBAMOL PRN MG: 500 TABLET ORAL at 17:48

## 2022-11-02 LAB
ALBUMIN SERPL-MCNC: 3.7 G/DL (ref 3.4–5)
ALP SERPL-CCNC: 66 U/L (ref 45–117)
ALT SERPL-CCNC: 18 U/L (ref 13–61)
ANION GAP SERPL CALC-SCNC: 6 MMOL/L (ref 8–16)
AST SERPL-CCNC: 12 U/L (ref 15–37)
BILIRUB SERPL-MCNC: 0.2 MG/DL (ref 0.2–1)
BUN SERPL-MCNC: 12.3 MG/DL (ref 7–18)
CALCIUM SERPL-MCNC: 9 MG/DL (ref 8.5–10.1)
CHLORIDE SERPL-SCNC: 108 MMOL/L (ref 98–107)
CO2 SERPL-SCNC: 28 MMOL/L (ref 21–32)
CREAT SERPL-MCNC: 0.8 MG/DL (ref 0.55–1.3)
DEPRECATED RDW RBC AUTO: 13.4 % (ref 11.9–15.9)
GLUCOSE SERPL-MCNC: 89 MG/DL (ref 74–106)
HCT VFR BLD CALC: 43.1 % (ref 35.4–49)
HGB BLD-MCNC: 14.7 GM/DL (ref 11.7–16.9)
MCH RBC QN AUTO: 30.5 PG (ref 25.7–33.7)
MCHC RBC AUTO-ENTMCNC: 34.2 G/DL (ref 32–35.9)
MCV RBC: 89.1 FL (ref 80–96)
PLATELET # BLD AUTO: 252 10^3/UL (ref 134–434)
PMV BLD: 7.5 FL (ref 7.5–11.1)
PROT SERPL-MCNC: 6.8 G/DL (ref 6.4–8.2)
RBC # BLD AUTO: 4.84 M/MM3 (ref 4–5.6)
SODIUM SERPL-SCNC: 142 MMOL/L (ref 136–145)
WBC # BLD AUTO: 6.1 K/MM3 (ref 4–10)

## 2022-11-02 RX ADMIN — Medication SCH: at 22:48

## 2022-11-02 RX ADMIN — Medication SCH TAB: at 10:31

## 2022-11-02 RX ADMIN — METHOCARBAMOL PRN MG: 500 TABLET ORAL at 10:31

## 2022-11-02 RX ADMIN — NICOTINE POLACRILEX PRN MG: 2 GUM, CHEWING ORAL at 09:42

## 2022-11-02 RX ADMIN — HYDROXYZINE PAMOATE PRN MG: 25 CAPSULE ORAL at 10:31

## 2022-11-02 RX ADMIN — NICOTINE POLACRILEX PRN MG: 2 GUM, CHEWING ORAL at 04:55

## 2022-11-03 VITALS
DIASTOLIC BLOOD PRESSURE: 75 MMHG | HEART RATE: 64 BPM | SYSTOLIC BLOOD PRESSURE: 117 MMHG | RESPIRATION RATE: 16 BRPM | TEMPERATURE: 96.9 F

## 2022-11-03 RX ADMIN — METHOCARBAMOL PRN MG: 500 TABLET ORAL at 09:59

## 2022-11-03 RX ADMIN — NICOTINE POLACRILEX PRN MG: 2 GUM, CHEWING ORAL at 10:01

## 2022-11-03 RX ADMIN — HYDROXYZINE PAMOATE PRN MG: 25 CAPSULE ORAL at 09:59

## 2022-11-03 RX ADMIN — NICOTINE POLACRILEX PRN MG: 2 GUM, CHEWING ORAL at 07:17

## 2022-11-03 RX ADMIN — Medication SCH TAB: at 09:59

## 2023-01-18 ENCOUNTER — HOSPITAL ENCOUNTER (INPATIENT)
Dept: HOSPITAL 74 - YASAS | Age: 38
LOS: 2 days | Discharge: LEFT BEFORE DISCHARGE | DRG: 770 | End: 2023-01-20
Attending: SURGERY | Admitting: ALLERGY & IMMUNOLOGY
Payer: COMMERCIAL

## 2023-01-18 VITALS — BODY MASS INDEX: 18.9 KG/M2

## 2023-01-18 DIAGNOSIS — R63.4: ICD-10-CM

## 2023-01-18 DIAGNOSIS — F11.23: Primary | ICD-10-CM

## 2023-01-18 DIAGNOSIS — F13.20: ICD-10-CM

## 2023-01-18 DIAGNOSIS — F17.210: ICD-10-CM

## 2023-01-18 PROCEDURE — HZ2ZZZZ DETOXIFICATION SERVICES FOR SUBSTANCE ABUSE TREATMENT: ICD-10-PCS | Performed by: SURGERY

## 2023-01-18 PROCEDURE — U0005 INFEC AGEN DETEC AMPLI PROBE: HCPCS

## 2023-01-18 PROCEDURE — U0003 INFECTIOUS AGENT DETECTION BY NUCLEIC ACID (DNA OR RNA); SEVERE ACUTE RESPIRATORY SYNDROME CORONAVIRUS 2 (SARS-COV-2) (CORONAVIRUS DISEASE [COVID-19]), AMPLIFIED PROBE TECHNIQUE, MAKING USE OF HIGH THROUGHPUT TECHNOLOGIES AS DESCRIBED BY CMS-2020-01-R: HCPCS

## 2023-01-18 RX ADMIN — Medication SCH: at 22:43

## 2023-01-18 RX ADMIN — Medication SCH: at 22:44

## 2023-01-19 LAB
ALBUMIN SERPL-MCNC: 4 G/DL (ref 3.4–5)
ALP SERPL-CCNC: 58 U/L (ref 45–117)
ALT SERPL-CCNC: 15 U/L (ref 13–61)
ANION GAP SERPL CALC-SCNC: 6 MMOL/L (ref 8–16)
AST SERPL-CCNC: 14 U/L (ref 15–37)
BILIRUB SERPL-MCNC: 0.3 MG/DL (ref 0.2–1)
BUN SERPL-MCNC: 13.9 MG/DL (ref 7–18)
CALCIUM SERPL-MCNC: 9.5 MG/DL (ref 8.5–10.1)
CHLORIDE SERPL-SCNC: 108 MMOL/L (ref 98–107)
CO2 SERPL-SCNC: 26 MMOL/L (ref 21–32)
CREAT SERPL-MCNC: 0.9 MG/DL (ref 0.55–1.3)
DEPRECATED RDW RBC AUTO: 13.7 % (ref 11.9–15.9)
GLUCOSE SERPL-MCNC: 92 MG/DL (ref 74–106)
HCT VFR BLD CALC: 44.8 % (ref 35.4–49)
HGB BLD-MCNC: 15.2 GM/DL (ref 11.7–16.9)
MCH RBC QN AUTO: 30.1 PG (ref 25.7–33.7)
MCHC RBC AUTO-ENTMCNC: 33.9 G/DL (ref 32–35.9)
MCV RBC: 88.9 FL (ref 80–96)
PLATELET # BLD AUTO: 232 10^3/UL (ref 134–434)
PMV BLD: 7.6 FL (ref 7.5–11.1)
PROT SERPL-MCNC: 7.3 G/DL (ref 6.4–8.2)
RBC # BLD AUTO: 5.04 M/MM3 (ref 4–5.6)
SODIUM SERPL-SCNC: 139 MMOL/L (ref 136–145)
WBC # BLD AUTO: 7.1 K/MM3 (ref 4–10)

## 2023-01-19 RX ADMIN — NICOTINE POLACRILEX PRN MG: 4 GUM, CHEWING ORAL at 19:24

## 2023-01-19 RX ADMIN — Medication SCH TAB: at 10:01

## 2023-01-19 RX ADMIN — NICOTINE POLACRILEX PRN MG: 4 GUM, CHEWING ORAL at 10:04

## 2023-01-19 RX ADMIN — Medication SCH: at 23:24

## 2023-01-19 RX ADMIN — NICOTINE POLACRILEX PRN MG: 4 GUM, CHEWING ORAL at 12:22

## 2023-01-19 RX ADMIN — NICOTINE POLACRILEX PRN MG: 4 GUM, CHEWING ORAL at 16:58

## 2023-01-20 VITALS
SYSTOLIC BLOOD PRESSURE: 122 MMHG | DIASTOLIC BLOOD PRESSURE: 79 MMHG | HEART RATE: 99 BPM | TEMPERATURE: 97.7 F | RESPIRATION RATE: 18 BRPM

## 2023-01-20 RX ADMIN — Medication SCH TAB: at 10:36

## 2023-01-20 RX ADMIN — NICOTINE POLACRILEX PRN MG: 4 GUM, CHEWING ORAL at 02:30
